# Patient Record
(demographics unavailable — no encounter records)

---

## 2024-11-25 NOTE — HISTORY OF PRESENT ILLNESS
[Never] : never [TextBox_4] : Referred by Dr. Peñaloza for Pulmonary Hypertension evaluation in the setting of dilated pulmonary artery, elevated PASP (78 mmHg), worsening dyspnea on exertion and a 6MWT with desaturation to 87% necessitating O2. 2L Her history is significant for limited scleroderma, congestive heart failure with diastolic dysfunction, ground glass opacities on chest CT, multilobar pneumonia, pleural effusion, decreased diffusion capacity of lung and worsenign BRANHAM since COVID booster 11/2021. Concern is for WHO Group II (HFpEF, HTN, LVH, JESUS), III (found to have severe BO AHI 61), at risk for WHO Group I in setting of limited scleroderma. V/Q scan (neg for CTEPH). She did not tolerate CPAP and her most recent echo 9/2022 PASP was 66 mmHg, but RV size/function now normalized, + Grade II diastolic dysfunction and severe LAE. She started Jardiance October 2022.  Had significant Raynauds w/ ulceration and ultimately started on sildenafil, isosorbide was discontinued. Diuretics adjusted for LE edema. She was unable to tolerate PAP for severe BO, is not a candidate for inspire. Found to have pulmonary nodule incidentally when sent for CT A/P, enlarging from 2mm in 2019 to 9mm in 2024 that is not PET avid but likely represents malignancy.  PH regimen sildenafil 20 mg TID  11-27-24 She had her repeat chest CT in August and Dr. Zapata would like to repeat it in February 2025.   8-7-24: Would like a pre-op clearance for L TKA scheduled for 8/28 at [facility]. Does not want to use CPAP as it affects her sleep. Not interested in trying anything for it and understands risks of MI, CVA, and death without using it. Continues to take sildenafil 20 mg TID.  No fever, chills, chest pain, cough. Mentions improved leg swelling. Lost some weight 8lbs since May 2024- eating less. Shortness of breath improved since the last visit. Can walk 2-3 blocks before feeling short of breath.

## 2024-11-25 NOTE — PHYSICAL EXAM
[No Acute Distress] : no acute distress [Normal Oropharynx] : normal oropharynx [Normal Appearance] : normal appearance [No Neck Mass] : no neck mass [Normal Rate/Rhythm] : normal rate/rhythm [Normal S1, S2] : normal s1, s2 [No Murmurs] : no murmurs [No Resp Distress] : no resp distress [Clear to Auscultation Bilaterally] : clear to auscultation bilaterally [No Abnormalities] : no abnormalities [Benign] : benign [No Clubbing] : no clubbing [No Cyanosis] : no cyanosis [FROM] : FROM [2+ Pitting] : 2+ pitting [Normal Color/ Pigmentation] : normal color/ pigmentation [No Focal Deficits] : no focal deficits [Oriented x3] : oriented x3 [Normal Affect] : normal affect [TextBox_105] : Asymmetric edema, R>L slightly  [TextBox_125] : left 4th digit s/p DIP joint resected for Raynaud's; no exudate or wheeping, tender but area surrounding is not warm

## 2024-11-25 NOTE — CONSULT LETTER
[Dear  ___] : Dear  [unfilled], [Consult Letter:] : I had the pleasure of evaluating your patient, [unfilled]. [Please see my note below.] : Please see my note below. [Consult Closing:] : Thank you very much for allowing me to participate in the care of this patient.  If you have any questions, please do not hesitate to contact me. [Sincerely,] : Sincerely, [DrMargo  ___] : Dr. MATOS [FreeTextEntry2] : Dr. Fern Peñaloza\par  [address]\[place] [address]\par  \par  Fax [phone] [FreeTextEntry3] : Glory River DO \par  Director of Pulmonary Hypertension\par  Department of Pulmonary and Critical Care\par  [facility]

## 2024-11-25 NOTE — ASSESSMENT
[FreeTextEntry1] : 78 yo F with hx of CAD, HTN, HFpEF, pAF, limited scleroderma referred to PH clinic for BRANHAM a/w elevated PASP  1. Pulmonary hypertension- Multifactorial, likely WHO Group I 2/2 scleroderma, WHO Group II PH given HFpEF, severe LAE, HTN, and WHO Group III PH 2/2 severe untreated BO. NYHA FC has improved to I-II (initially III at first visit) with diuresis. She had concern for Barton's sign on prior TTE, but V/Q negative and repeat TTE with normal RV size/function although PASP remains elevated, grade II diastolic dysfunction, and severe LAE.  Her sleep study also confirms severe BO with AHI of 61 and randee SpO2 of 75% however she has been unable to tolerate PAP. She has also subsequently been started on sildenafil for Raynaud's, is at risk for worsening PH-LHD if she is predominantly group II. RHC performed with complex results - she is predominantly precapillary at baseline when well diuresed; however, performed vasoreactivity testing for severe PH and paradoxically had worsening PAWP (20, V waves to 27mmHg) and decrease in CO/CI. Has remained on sildenafil due to severe raynaud's and no worsening of symptoms.   Will plan to continue sildenafil 20mg TID, but given drop in CO/CI w/ Shaun and occasional dizziness/low BP with sildenafil her verapamil was discontinued. TTE repeated 1/2024 with normal RV size/function, normal LV, mild TR and PASP 47mmHg from 38mmHg 11/2023 (was 66mmHg in 2022). DLCO severely reduced although stable from 1 year ago, lung volumes unchanged, walk distance stable and no longer hypoxic with exertion.  Continue diuretics and sildenafil, trend NTproBNP  2. Enlarging pulmonary nodule - Found incidentally on CT A/P, CT chest with 9mm growing RLL nodule, central and not amenable to CT guided or bronchoscopic bx. Referred to CTS - given age, comorbidities, and severely reduced DLCO she is high risk for surgery. PET scan without uptake (measured on PET to be 7mm). Unclear rate of growth - if continues to enlarge over short period of time (ie 6 months) would recommend resection, but if remains very slow growing would likely opt for monitoring. Plan to repeat CT chest in 6 months (9/2024)  3. Obstructive Sleep Apnea AHI 62.2  -unable to tolerate treatment, this increases her risk of cardiopulmonary disease but she understands. Has some night time awakenings with SOB, but no alternative treatment available for her. Not a candidate for surgical intervention. Recommend she continue nocturnal oxygen supplementation for significantly increased time spent hypoxic <90% at night.  In summary: - She will continue to follow with her cardiologist, maintain euvolemia and BP control. She is no longer on isosorbide 30 mg BID or verapamil. Currently on sildenafil 20mg TID per rheumatology.  - She no longer desaturates with exertion, but has oxygen therapy as needed, lung volumes and DLCO stable - Continue sildenafil 20mg TID for now as it has improved Raynaud's and she has significant precapillary component.  - Continue Jardiance, lasix 4 days a week, spironolactone 3 days a week - Repeat CT chest in September (6 months) to reassess nodule; already ordered by CT surgery - Planning for knee replacement Aug 28th 2024, see pre op evaluation below - Repeat PFTs and 6MWT every 6 months (October 2024) - TTE annually (Jan 2025) - BNP ordered for today  Pre-op pulmonary clearance: ARISCAT score: 11 low risk; 1.6% in hospital post-op pulmonary complications SUGGS score: 5% risk of mechanical ventilation for >48 hours after surgery, or unexplained intubation <30 days of surgery *Of note, these pre-op risk calculators do not take into account patient's BO or PH. She remains high risk for general anesthesia in setting of PH, although medically she is optimized. There is no absolute pulmonary contraindication to proceed with knee surgery. Requires cardiac clearance pending stress test. Recommend spinal/regional anesthesia for upcoming L TKA in setting of severe BO (AHI 61). If GA required, recommend cardiac anesthesia and please extubate to PAP therapy

## 2024-11-25 NOTE — REASON FOR VISIT
[Follow-Up] : a follow-up visit [Pulmonary Hypertension] : pulmonary hypertension [Shortness of Breath] : shortness of breath [[employer] ] : provided by [employer]   [TextBox_13] : Dr. Fern Peñaloza [Interpreters_IDNumber] : [id number] [Interpreters_FullName] : Naz

## 2024-11-25 NOTE — REVIEW OF SYSTEMS
[Chest Tightness] : chest tightness [SOB on Exertion] : sob on exertion [Raynaud] : raynaud [Obesity] : obesity [Negative] : Endocrine [TextBox_104] : gangrenous digit

## 2024-11-25 NOTE — END OF VISIT
[] : Fellow [Time Spent: ___ minutes] : I have spent [unfilled] minutes of time on the encounter which excludes teaching and separately reported services. [FreeTextEntry3] : WHO Group I PAH 2/2 CTD and element of HFpEF improved on sildenafil, but limited in increasing therapy due to presence of HFpEF. Hypoxia improved, 6MWT increased although still severely reduced, but may be limited 2/2 knee pain. Planning for TKR, pt will always remain higher risk for general anesthesia, but she is medically optimized. Recommend consideration of local/spinal anesthesia. If not possible, consider cardiac anesthesia for monitoring. TTE with significantly improved PASP although PASP has always been underestimated compared to her RHC. Awaiting stress test by cardiology. Medically cleared from pulmonary standpoint. Repeat PFT, 6MWT, and NTproBNP in 3 months.

## 2024-11-25 NOTE — PROCEDURE
[FreeTextEntry1] : CT Chest non-con 8/17/24: Impression: 1. No developing suspicious parenchymal nodules or masses. There is a 6 mm solid nodule within the medial basal segment of the right lower lobe unchanged from 4/29/2024 and 3/6/2024. It appears more conspicuous in size when compared to 11/11/2021 when it measured 4 mm. No visible uptake of this nodule in whole-body PET dated 4/29/2024. Continued short interval surveillance of this nodule in 6-12 months is suggested to confirm stability. 2. No other significant interval changes. ***** PFT 4/2024 FVC 2.08 (89%) --> 2.08  FEV1 1.50 (87%) --> 1.55 FEV1/FVC 72% TLC 3.24 (73%) DLCO 6.22 (34%)  6MWT 210 meters, SpO2 99% --> 93% on RA, HR 56 --> 72, /85 --> 170/84  DLCO severely reduced although stable from 1 year ago, lung volumes unchanged, walk distance stable and no longer hypoxic with exertion.  *** PET-CT performed on 4/29/24: 7 mm nodule at the basilar right lower lobe does not demonstrate visible uptake No hypermetabolic pulmonary nodules. ***** 3/6/24 CT Chest at [facility] Enlarging RLL nodule 2mm -> 9 mm Enlarged PA Stable mildly patulous thoracic esophagus with questionable h/o sceroderma Decreased pleural effusions Stable borderline mediastinal adenopathy Moderate coronary artery calcifications *****  11/8/23 ECHO 1. Mild symmetric left ventricular hypertrophy.  2. Normal left ventricular systolic function.  3. Grade II left ventricular diastolic dysfunction.  4. Normal right ventricular size and systolic function.  5. Dilated left atrium.  6. Mild-to-moderate mitral regurgitation.  7. Aortic sclerosis without significant stenosis.  8. Pulmonary artery systolic pressure is 38 mmHg.  9. Trivial pericardial effusion. 10. 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 This was done OFF sildenafil. F/U in PH clinic **** 3/29/22 PFT and 6MWT  FVC 2.06 (87) FEV1 1.59 (90) FEV1/FVC 77 FEF 25-75% 1.34 (94) TLC 3.35 (76) VC 2.06 (87) RV/TLC 38.64 (86) DLCO 5.94 (32)  225 meters Resting SpO2 94% on room air With 3 min 10 sec remaining SpO2 dropped to 88% on room air. Pt took a short break and continued walking End test SpO2 88% on room air Hemant 3 ***** 3/21/23 ECHO  1. Normal left ventricular size and systolic function.  2. Grade II left ventricular diastolic dysfunction.  3. Normal right ventricular size and systolic function.  4. Severely dilated left atrium.  5. Aortic sclerosis without significant stenosis.  6. Mild mitral regurgitation.  7. Pulmonary hypertension present, pulmonary artery systolic pressure is 41 mmHg.  8. Trivial pericardial effusion.  9. Compared to the previous TTE performed on 9/13/2022, PASP is lower in this study. ***** 10/3/22 [employer] APAP Compliance Data 6/78 days usage (7.7% compliance) avg use 1 hr 7 min ***** 9/13/22 ECHO 1. Normal left ventricular size and systolic function. 2. Grade II left ventricular diastolic dysfunction. 3. Normal right ventricular size and systolic function. 4. Severely dilated left atrium. 5. Mild mitral regurgitation. 6. Mild tricuspid regurgitation. 7. Pulmonary hypertension present, pulmonary artery systolic pressure is 66 mmHg. 8. No pericardial effusion. 9. Compared to the previous TTE performed on 2/18/2022, right ventricular function is normal and there is severe pulmonary hypertension ***** 5/23/22 LABS NT pro  ***** 2/18/22 V/Q scan No evidence of pulmonary emboli ***** 2/18/22 ECHO  1. Normal left ventricular cavity size and systolic function.  2. Grade III left ventricular diastolic dysfunction with elevated  filling pressure.  3. Mildly dilated right ventricle.  4. Moderately reduced right ventricular systolic function with apical  sparing consistent with possible pulmonary embolism (Barton's sign).  5. Mildly dilated left atrium.  6. Aortic sclerosis without significant stenosis.  7. Mild mitral regurgitation.  8. Mild tricuspid regurgitation.  9. Pulmonary hypertension present, pulmonary artery systolic pressure is  45 mmHg. 10. No pericardial effusion ***** 1/25/22 HST AHI 62.2 Gian SpO2 75% ***** 1/14/22 LABS Pro  Creatinine 1.04 *****   11/10/21  PFT FVC  2.06 (90) FEV1 1.51 (89) FEF 25-75%  1.04 (75) TLC 4.32 (98) VC  2.04 (89) RV/TLC  53 (115) DLCO 5.15 (29) Non obstructive  normal lung volumes no restrictive disease Decrease diffusion capacity  6MWT 240 meters Baseline SpO2 95% on room air SpO2 dropped to 85%, give 2L O2 End of test SpO2 94% on 2L O2 ***** 10/29/21 ECHO by Dr. Wolfe Normal LV systolic function with EF estimated 65% Basal septal hypertrophy with evidence of JESUS, no significant VLOT obstructions No evidence of LVH Anterior/posterior pericardial fat/fluid layer PASP 78 mmHg Right heart normal in size and structure ***** 11/12/19 FVC  2.34 (99) -> 2.28 (97) FEV1 1.88 (107) -> 1.46 (101) FEV1/FVC  80 -> 78 FEF 25-75% 1.75 (118) -> 1.44 (97) TLC  4.85 (109) VC  2.35 (100) RV  2.50 (121) RV/TLC 52 DLCO  6 (33)  No obstructive disease.  Normal lung volumes Decreased diffusion capacity

## 2025-01-24 NOTE — END OF VISIT
[FreeTextEntry3] : WHO Group I PAH 2/2 CTD and element of HFpEF improved on sildenafil, but limited in increasing therapy due to presence of HFpEF. Hypoxia improved, 6MWT increased although still severely reduced, but may be limited 2/2 knee pain. Planning for TKR, pt will always remain higher risk for general anesthesia, but she is medically optimized. Recommend consideration of local/spinal anesthesia. If not possible, consider cardiac anesthesia for monitoring. TTE with significantly improved PASP although PASP has always been underestimated compared to her RHC. Awaiting stress test by cardiology. Medically cleared from pulmonary standpoint. Repeat PFT, 6MWT, and NTproBNP in 3 months. 
[employer]

## 2025-01-27 NOTE — CONSULT LETTER
[FreeTextEntry2] : Dr. Fern Peñaloza\par  [address]\[place] [address]\par  \par  Fax [phone] [FreeTextEntry3] : Glory River DO \par  Director of Pulmonary Hypertension\par  Department of Pulmonary and Critical Care\par  [facility]

## 2025-01-27 NOTE — PHYSICAL EXAM
[TextBox_105] : Asymmetric 2+ pitting edema even with compression stockings, L>R (history of L TKA) [TextBox_125] : left 4th digit s/p DIP joint resected for Raynaud's; no exudate or wheeping, tender but area surrounding is not warm

## 2025-01-27 NOTE — HISTORY OF PRESENT ILLNESS
[TextBox_4] : Referred by Dr. Peñaloza for Pulmonary Hypertension evaluation in the setting of dilated pulmonary artery, elevated PASP (78 mmHg), worsening dyspnea on exertion and a 6MWT with desaturation to 87% necessitating O2. 2L Her history is significant for limited scleroderma, congestive heart failure with diastolic dysfunction, ground glass opacities on chest CT, multilobar pneumonia, pleural effusion, decreased diffusion capacity of lung and worsenign BRANHAM since COVID booster 11/2021. Concern is for WHO Group II (HFpEF, HTN, LVH, JESUS), III (found to have severe BO AHI 61), at risk for WHO Group I in setting of limited scleroderma. V/Q scan (neg for CTEPH). She did not tolerate CPAP and her most recent echo 9/2022 PASP was 66 mmHg, but RV size/function now normalized, + Grade II diastolic dysfunction and severe LAE. She started Jardiance October 2022.  Had significant Raynauds w/ ulceration and ultimately started on sildenafil, isosorbide was discontinued. Diuretics adjusted for LE edema. She was unable to tolerate PAP for severe BO, is not a candidate for inspire. Found to have pulmonary nodule incidentally when sent for CT A/P, enlarging from 2mm in 2019 to 9mm in 2024 that is not PET avid but likely represents malignancy.  PH regimen sildenafil 20 mg TID  1-24-25 She had her repeat chest CT in August and Dr. Zapata would like to repeat it in February 2025.  Mentions shortness of breath with exertion and palpitations, relatively stable from prior. Ambulation has improved since knee surgery as knee pain is much better, but has now chronic LE edema of LLE since surgery. Has BO but cannot tolerate CPAP. Denies fever, chills, night sweats, or weight loss.

## 2025-01-27 NOTE — CONSULT LETTER
[FreeTextEntry2] : Dr. Fern Peñaloza\par  [address]\[place] [address]\par  \par  Fax [phone] [FreeTextEntry3] : Glory River DO \par  Director of Pulmonary Hypertension\par  Department of Pulmonary and Critical Care\par  [facility]

## 2025-01-27 NOTE — ASSESSMENT
[FreeTextEntry1] : 78 yo F with hx of CAD, HTN, HFpEF, pAF, limited scleroderma referred to PH clinic for BRANHAM a/w elevated PASP  1. Pulmonary hypertension- Multifactorial, likely WHO Group I 2/2 scleroderma, WHO Group II PH given HFpEF, severe LAE, HTN, and WHO Group III PH 2/2 severe untreated BO. NYHA FC has improved to I-II (initially III at first visit) with diuresis. She had concern for Barton's sign on prior TTE, but V/Q negative and repeat TTE with normal RV size/function although PASP remains elevated, grade II diastolic dysfunction, and severe LAE.  Her sleep study also confirms severe BO with AHI of 61 and randee SpO2 of 75% however she has been unable to tolerate PAP. She has also subsequently been started on sildenafil for Raynaud's, is at risk for worsening PH-LHD if she is predominantly group II. RHC performed with complex results - she is predominantly precapillary at baseline when well diuresed; however, performed vasoreactivity testing for severe PH and paradoxically had worsening PAWP (20, V waves to 27mmHg) and decrease in CO/CI. Has remained on sildenafil due to severe raynaud's and no worsening of symptoms.   Will plan to continue sildenafil 20mg TID, but given drop in CO/CI w/ Shaun and occasional dizziness/low BP with sildenafil her verapamil was discontinued. TTE repeated 1/2024 with normal RV size/function, normal LV, mild TR and PASP 47mmHg from 38mmHg 11/2023 (was 66mmHg in 2022). DLCO severely reduced although stable from 1 year ago, lung volumes unchanged, walk distance stable and no longer hypoxic with exertion.  -Continue diuretics and sildenafil. -Obtain NTproBNP, PFTs, and anticipate results of TTE and CT chest will result in time. -No change in diuretic dosage as her symptoms are not significant per the patient and LE edema appears unchanged.  2. Enlarging pulmonary nodule - Found incidentally on CT A/P, CT chest with 9mm growing RLL nodule, central and not amenable to CT guided or bronchoscopic bx. Referred to CTS - given age, comorbidities, and severely reduced DLCO she is high risk for surgery. PET scan without uptake (measured on PET to be 7mm). Unclear rate of growth - if continues to enlarge over short period of time (ie 6 months) would recommend resection, but if remains very slow growing would likely opt for monitoring. Plan to repeat CT chest in 6 months (2/2025)  3. Obstructive Sleep Apnea AHI 62.2  -unable to tolerate treatment, this increases her risk of cardiopulmonary disease but she understands. Has some night time awakenings with SOB, but no alternative treatment available for her. Not a candidate for surgical intervention. Recommend she continue nocturnal oxygen supplementation for significantly increased time spent hypoxic <90% at night.  RTC w/ PFT and 6MWT in 3 months

## 2025-01-27 NOTE — PROCEDURE
Pt only has one front tooth, that tooth is very decayed, front part of tooth popped off with biteblock.   Pt states just throw it away bc it is already decayed and about to fall off anyways [FreeTextEntry1] : CT Chest non-con 8/17/24: Impression: 1. No developing suspicious parenchymal nodules or masses. There is a 6 mm solid nodule within the medial basal segment of the right lower lobe unchanged from 4/29/2024 and 3/6/2024. It appears more conspicuous in size when compared to 11/11/2021 when it measured 4 mm. No visible uptake of this nodule in whole-body PET dated 4/29/2024. Continued short interval surveillance of this nodule in 6-12 months is suggested to confirm stability. 2. No other significant interval changes. ***** PFT 4/2024 FVC 2.08 (89%) --> 2.08  FEV1 1.50 (87%) --> 1.55 FEV1/FVC 72% TLC 3.24 (73%) DLCO 6.22 (34%)  6MWT 210 meters, SpO2 99% --> 93% on RA, HR 56 --> 72, /85 --> 170/84  DLCO severely reduced although stable from 1 year ago, lung volumes unchanged, walk distance stable and no longer hypoxic with exertion.  *** PET-CT performed on 4/29/24: 7 mm nodule at the basilar right lower lobe does not demonstrate visible uptake No hypermetabolic pulmonary nodules. ***** 3/6/24 CT Chest at [facility] Enlarging RLL nodule 2mm -> 9 mm Enlarged PA Stable mildly patulous thoracic esophagus with questionable h/o sceroderma Decreased pleural effusions Stable borderline mediastinal adenopathy Moderate coronary artery calcifications *****  11/8/23 ECHO 1. Mild symmetric left ventricular hypertrophy.  2. Normal left ventricular systolic function.  3. Grade II left ventricular diastolic dysfunction.  4. Normal right ventricular size and systolic function.  5. Dilated left atrium.  6. Mild-to-moderate mitral regurgitation.  7. Aortic sclerosis without significant stenosis.  8. Pulmonary artery systolic pressure is 38 mmHg.  9. Trivial pericardial effusion. 10. 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 This was done OFF sildenafil. F/U in PH clinic **** 3/29/22 PFT and 6MWT  FVC 2.06 (87) FEV1 1.59 (90) FEV1/FVC 77 FEF 25-75% 1.34 (94) TLC 3.35 (76) VC 2.06 (87) RV/TLC 38.64 (86) DLCO 5.94 (32)  225 meters Resting SpO2 94% on room air With 3 min 10 sec remaining SpO2 dropped to 88% on room air. Pt took a short break and continued walking End test SpO2 88% on room air Hemant 3 ***** 3/21/23 ECHO  1. Normal left ventricular size and systolic function.  2. Grade II left ventricular diastolic dysfunction.  3. Normal right ventricular size and systolic function.  4. Severely dilated left atrium.  5. Aortic sclerosis without significant stenosis.  6. Mild mitral regurgitation.  7. Pulmonary hypertension present, pulmonary artery systolic pressure is 41 mmHg.  8. Trivial pericardial effusion.  9. Compared to the previous TTE performed on 9/13/2022, PASP is lower in this study. ***** 10/3/22 [employer] APAP Compliance Data 6/78 days usage (7.7% compliance) avg use 1 hr 7 min ***** 9/13/22 ECHO 1. Normal left ventricular size and systolic function. 2. Grade II left ventricular diastolic dysfunction. 3. Normal right ventricular size and systolic function. 4. Severely dilated left atrium. 5. Mild mitral regurgitation. 6. Mild tricuspid regurgitation. 7. Pulmonary hypertension present, pulmonary artery systolic pressure is 66 mmHg. 8. No pericardial effusion. 9. Compared to the previous TTE performed on 2/18/2022, right ventricular function is normal and there is severe pulmonary hypertension ***** 5/23/22 LABS NT pro  ***** 2/18/22 V/Q scan No evidence of pulmonary emboli ***** 2/18/22 ECHO  1. Normal left ventricular cavity size and systolic function.  2. Grade III left ventricular diastolic dysfunction with elevated  filling pressure.  3. Mildly dilated right ventricle.  4. Moderately reduced right ventricular systolic function with apical  sparing consistent with possible pulmonary embolism (Barton's sign).  5. Mildly dilated left atrium.  6. Aortic sclerosis without significant stenosis.  7. Mild mitral regurgitation.  8. Mild tricuspid regurgitation.  9. Pulmonary hypertension present, pulmonary artery systolic pressure is  45 mmHg. 10. No pericardial effusion ***** 1/25/22 HST AHI 62.2 Gian SpO2 75% ***** 1/14/22 LABS Pro  Creatinine 1.04 *****   11/10/21  PFT FVC  2.06 (90) FEV1 1.51 (89) FEF 25-75%  1.04 (75) TLC 4.32 (98) VC  2.04 (89) RV/TLC  53 (115) DLCO 5.15 (29) Non obstructive  normal lung volumes no restrictive disease Decrease diffusion capacity  6MWT 240 meters Baseline SpO2 95% on room air SpO2 dropped to 85%, give 2L O2 End of test SpO2 94% on 2L O2 ***** 10/29/21 ECHO by Dr. Wolfe Normal LV systolic function with EF estimated 65% Basal septal hypertrophy with evidence of JESUS, no significant VLOT obstructions No evidence of LVH Anterior/posterior pericardial fat/fluid layer PASP 78 mmHg Right heart normal in size and structure ***** 11/12/19 FVC  2.34 (99) -> 2.28 (97) FEV1 1.88 (107) -> 1.46 (101) FEV1/FVC  80 -> 78 FEF 25-75% 1.75 (118) -> 1.44 (97) TLC  4.85 (109) VC  2.35 (100) RV  2.50 (121) RV/TLC 52 DLCO  6 (33)  No obstructive disease.  Normal lung volumes Decreased diffusion capacity

## 2025-01-27 NOTE — PROCEDURE
[FreeTextEntry1] : CT Chest non-con 8/17/24: Impression: 1. No developing suspicious parenchymal nodules or masses. There is a 6 mm solid nodule within the medial basal segment of the right lower lobe unchanged from 4/29/2024 and 3/6/2024. It appears more conspicuous in size when compared to 11/11/2021 when it measured 4 mm. No visible uptake of this nodule in whole-body PET dated 4/29/2024. Continued short interval surveillance of this nodule in 6-12 months is suggested to confirm stability. 2. No other significant interval changes. ***** PFT 4/2024 FVC 2.08 (89%) --> 2.08  FEV1 1.50 (87%) --> 1.55 FEV1/FVC 72% TLC 3.24 (73%) DLCO 6.22 (34%)  6MWT 210 meters, SpO2 99% --> 93% on RA, HR 56 --> 72, /85 --> 170/84  DLCO severely reduced although stable from 1 year ago, lung volumes unchanged, walk distance stable and no longer hypoxic with exertion.  *** PET-CT performed on 4/29/24: 7 mm nodule at the basilar right lower lobe does not demonstrate visible uptake No hypermetabolic pulmonary nodules. ***** 3/6/24 CT Chest at [facility] Enlarging RLL nodule 2mm -> 9 mm Enlarged PA Stable mildly patulous thoracic esophagus with questionable h/o sceroderma Decreased pleural effusions Stable borderline mediastinal adenopathy Moderate coronary artery calcifications *****  11/8/23 ECHO 1. Mild symmetric left ventricular hypertrophy.  2. Normal left ventricular systolic function.  3. Grade II left ventricular diastolic dysfunction.  4. Normal right ventricular size and systolic function.  5. Dilated left atrium.  6. Mild-to-moderate mitral regurgitation.  7. Aortic sclerosis without significant stenosis.  8. Pulmonary artery systolic pressure is 38 mmHg.  9. Trivial pericardial effusion. 10. 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 This was done OFF sildenafil. F/U in PH clinic **** 3/29/22 PFT and 6MWT  FVC 2.06 (87) FEV1 1.59 (90) FEV1/FVC 77 FEF 25-75% 1.34 (94) TLC 3.35 (76) VC 2.06 (87) RV/TLC 38.64 (86) DLCO 5.94 (32)  225 meters Resting SpO2 94% on room air With 3 min 10 sec remaining SpO2 dropped to 88% on room air. Pt took a short break and continued walking End test SpO2 88% on room air Hemant 3 ***** 3/21/23 ECHO  1. Normal left ventricular size and systolic function.  2. Grade II left ventricular diastolic dysfunction.  3. Normal right ventricular size and systolic function.  4. Severely dilated left atrium.  5. Aortic sclerosis without significant stenosis.  6. Mild mitral regurgitation.  7. Pulmonary hypertension present, pulmonary artery systolic pressure is 41 mmHg.  8. Trivial pericardial effusion.  9. Compared to the previous TTE performed on 9/13/2022, PASP is lower in this study. ***** 10/3/22 [employer] Connect APAP Compliance Data 6/78 days usage (7.7% compliance) avg use 1 hr 7 min ***** 9/13/22 ECHO 1. Normal left ventricular size and systolic function. 2. Grade II left ventricular diastolic dysfunction. 3. Normal right ventricular size and systolic function. 4. Severely dilated left atrium. 5. Mild mitral regurgitation. 6. Mild tricuspid regurgitation. 7. Pulmonary hypertension present, pulmonary artery systolic pressure is 66 mmHg. 8. No pericardial effusion. 9. Compared to the previous TTE performed on 2/18/2022, right ventricular function is normal and there is severe pulmonary hypertension ***** 5/23/22 LABS NT pro  ***** 2/18/22 V/Q scan No evidence of pulmonary emboli ***** 2/18/22 ECHO  1. Normal left ventricular cavity size and systolic function.  2. Grade III left ventricular diastolic dysfunction with elevated  filling pressure.  3. Mildly dilated right ventricle.  4. Moderately reduced right ventricular systolic function with apical  sparing consistent with possible pulmonary embolism (Barton's sign).  5. Mildly dilated left atrium.  6. Aortic sclerosis without significant stenosis.  7. Mild mitral regurgitation.  8. Mild tricuspid regurgitation.  9. Pulmonary hypertension present, pulmonary artery systolic pressure is  45 mmHg. 10. No pericardial effusion ***** 1/25/22 HST AHI 62.2 Gian SpO2 75% ***** 1/14/22 LABS Pro  Creatinine 1.04 *****   11/10/21  PFT FVC  2.06 (90) FEV1 1.51 (89) FEF 25-75%  1.04 (75) TLC 4.32 (98) VC  2.04 (89) RV/TLC  53 (115) DLCO 5.15 (29) Non obstructive  normal lung volumes no restrictive disease Decrease diffusion capacity  6MWT 240 meters Baseline SpO2 95% on room air SpO2 dropped to 85%, give 2L O2 End of test SpO2 94% on 2L O2 ***** 10/29/21 ECHO by Dr. Wolfe Normal LV systolic function with EF estimated 65% Basal septal hypertrophy with evidence of JESUS, no significant VLOT obstructions No evidence of LVH Anterior/posterior pericardial fat/fluid layer PASP 78 mmHg Right heart normal in size and structure ***** 11/12/19 FVC  2.34 (99) -> 2.28 (97) FEV1 1.88 (107) -> 1.46 (101) FEV1/FVC  80 -> 78 FEF 25-75% 1.75 (118) -> 1.44 (97) TLC  4.85 (109) VC  2.35 (100) RV  2.50 (121) RV/TLC 52 DLCO  6 (33)  No obstructive disease.  Normal lung volumes Decreased diffusion capacity

## 2025-01-27 NOTE — ASSESSMENT
[FreeTextEntry1] : 76 yo F with hx of CAD, HTN, HFpEF, pAF, limited scleroderma referred to PH clinic for BRANHAM a/w elevated PASP  1. Pulmonary hypertension- Multifactorial, likely WHO Group I 2/2 scleroderma, WHO Group II PH given HFpEF, severe LAE, HTN, and WHO Group III PH 2/2 severe untreated BO. NYHA FC has improved to I-II (initially III at first visit) with diuresis. She had concern for Barton's sign on prior TTE, but V/Q negative and repeat TTE with normal RV size/function although PASP remains elevated, grade II diastolic dysfunction, and severe LAE.  Her sleep study also confirms severe BO with AHI of 61 and randee SpO2 of 75% however she has been unable to tolerate PAP. She has also subsequently been started on sildenafil for Raynaud's, is at risk for worsening PH-LHD if she is predominantly group II. RHC performed with complex results - she is predominantly precapillary at baseline when well diuresed; however, performed vasoreactivity testing for severe PH and paradoxically had worsening PAWP (20, V waves to 27mmHg) and decrease in CO/CI. Has remained on sildenafil due to severe raynaud's and no worsening of symptoms.   Will plan to continue sildenafil 20mg TID, but given drop in CO/CI w/ Shaun and occasional dizziness/low BP with sildenafil her verapamil was discontinued. TTE repeated 1/2024 with normal RV size/function, normal LV, mild TR and PASP 47mmHg from 38mmHg 11/2023 (was 66mmHg in 2022). DLCO severely reduced although stable from 1 year ago, lung volumes unchanged, walk distance stable and no longer hypoxic with exertion.  -Continue diuretics and sildenafil. -Obtain NTproBNP, PFTs, and anticipate results of TTE and CT chest will result in time. -No change in diuretic dosage as her symptoms are not significant per the patient and LE edema appears unchanged.  2. Enlarging pulmonary nodule - Found incidentally on CT A/P, CT chest with 9mm growing RLL nodule, central and not amenable to CT guided or bronchoscopic bx. Referred to CTS - given age, comorbidities, and severely reduced DLCO she is high risk for surgery. PET scan without uptake (measured on PET to be 7mm). Unclear rate of growth - if continues to enlarge over short period of time (ie 6 months) would recommend resection, but if remains very slow growing would likely opt for monitoring. Plan to repeat CT chest in 6 months (2/2025)  3. Obstructive Sleep Apnea AHI 62.2  -unable to tolerate treatment, this increases her risk of cardiopulmonary disease but she understands. Has some night time awakenings with SOB, but no alternative treatment available for her. Not a candidate for surgical intervention. Recommend she continue nocturnal oxygen supplementation for significantly increased time spent hypoxic <90% at night.  RTC w/ PFT and 6MWT in 3 months

## 2025-01-27 NOTE — CONSULT LETTER
[FreeTextEntry2] : Dr. Fern Peñaloza\par  [address]\[place] [address]\par  \par  Fax [phone] [FreeTextEntry3] : Glory River DO \par  Director of Pulmonary Hypertension\par  Department of Pulmonary and Critical Care\par  [facility]

## 2025-01-27 NOTE — PROCEDURE
[FreeTextEntry1] : CT Chest non-con 8/17/24: Impression: 1. No developing suspicious parenchymal nodules or masses. There is a 6 mm solid nodule within the medial basal segment of the right lower lobe unchanged from 4/29/2024 and 3/6/2024. It appears more conspicuous in size when compared to 11/11/2021 when it measured 4 mm. No visible uptake of this nodule in whole-body PET dated 4/29/2024. Continued short interval surveillance of this nodule in 6-12 months is suggested to confirm stability. 2. No other significant interval changes. ***** PFT 4/2024 FVC 2.08 (89%) --> 2.08  FEV1 1.50 (87%) --> 1.55 FEV1/FVC 72% TLC 3.24 (73%) DLCO 6.22 (34%)  6MWT 210 meters, SpO2 99% --> 93% on RA, HR 56 --> 72, /85 --> 170/84  DLCO severely reduced although stable from 1 year ago, lung volumes unchanged, walk distance stable and no longer hypoxic with exertion.  *** PET-CT performed on 4/29/24: 7 mm nodule at the basilar right lower lobe does not demonstrate visible uptake No hypermetabolic pulmonary nodules. ***** 3/6/24 CT Chest at [facility] Enlarging RLL nodule 2mm -> 9 mm Enlarged PA Stable mildly patulous thoracic esophagus with questionable h/o sceroderma Decreased pleural effusions Stable borderline mediastinal adenopathy Moderate coronary artery calcifications *****  11/8/23 ECHO 1. Mild symmetric left ventricular hypertrophy.  2. Normal left ventricular systolic function.  3. Grade II left ventricular diastolic dysfunction.  4. Normal right ventricular size and systolic function.  5. Dilated left atrium.  6. Mild-to-moderate mitral regurgitation.  7. Aortic sclerosis without significant stenosis.  8. Pulmonary artery systolic pressure is 38 mmHg.  9. Trivial pericardial effusion. 10. 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 This was done OFF sildenafil. F/U in PH clinic **** 3/29/22 PFT and 6MWT  FVC 2.06 (87) FEV1 1.59 (90) FEV1/FVC 77 FEF 25-75% 1.34 (94) TLC 3.35 (76) VC 2.06 (87) RV/TLC 38.64 (86) DLCO 5.94 (32)  225 meters Resting SpO2 94% on room air With 3 min 10 sec remaining SpO2 dropped to 88% on room air. Pt took a short break and continued walking End test SpO2 88% on room air Hemant 3 ***** 3/21/23 ECHO  1. Normal left ventricular size and systolic function.  2. Grade II left ventricular diastolic dysfunction.  3. Normal right ventricular size and systolic function.  4. Severely dilated left atrium.  5. Aortic sclerosis without significant stenosis.  6. Mild mitral regurgitation.  7. Pulmonary hypertension present, pulmonary artery systolic pressure is 41 mmHg.  8. Trivial pericardial effusion.  9. Compared to the previous TTE performed on 9/13/2022, PASP is lower in this study. ***** 10/3/22 i [employer] Connect APAP Compliance Data 6/78 days usage (7.7% compliance) avg use 1 hr 7 min ***** 9/13/22 ECHO 1. Normal left ventricular size and systolic function. 2. Grade II left ventricular diastolic dysfunction. 3. Normal right ventricular size and systolic function. 4. Severely dilated left atrium. 5. Mild mitral regurgitation. 6. Mild tricuspid regurgitation. 7. Pulmonary hypertension present, pulmonary artery systolic pressure is 66 mmHg. 8. No pericardial effusion. 9. Compared to the previous TTE performed on 2/18/2022, right ventricular function is normal and there is severe pulmonary hypertension ***** 5/23/22 LABS NT pro  ***** 2/18/22 V/Q scan No evidence of pulmonary emboli ***** 2/18/22 ECHO  1. Normal left ventricular cavity size and systolic function.  2. Grade III left ventricular diastolic dysfunction with elevated  filling pressure.  3. Mildly dilated right ventricle.  4. Moderately reduced right ventricular systolic function with apical  sparing consistent with possible pulmonary embolism (Barton's sign).  5. Mildly dilated left atrium.  6. Aortic sclerosis without significant stenosis.  7. Mild mitral regurgitation.  8. Mild tricuspid regurgitation.  9. Pulmonary hypertension present, pulmonary artery systolic pressure is  45 mmHg. 10. No pericardial effusion ***** 1/25/22 HST AHI 62.2 Gian SpO2 75% ***** 1/14/22 LABS Pro  Creatinine 1.04 *****   11/10/21  PFT FVC  2.06 (90) FEV1 1.51 (89) FEF 25-75%  1.04 (75) TLC 4.32 (98) VC  2.04 (89) RV/TLC  53 (115) DLCO 5.15 (29) Non obstructive  normal lung volumes no restrictive disease Decrease diffusion capacity  6MWT 240 meters Baseline SpO2 95% on room air SpO2 dropped to 85%, give 2L O2 End of test SpO2 94% on 2L O2 ***** 10/29/21 ECHO by Dr. Wolfe Normal LV systolic function with EF estimated 65% Basal septal hypertrophy with evidence of JESUS, no significant VLOT obstructions No evidence of LVH Anterior/posterior pericardial fat/fluid layer PASP 78 mmHg Right heart normal in size and structure ***** 11/12/19 FVC  2.34 (99) -> 2.28 (97) FEV1 1.88 (107) -> 1.46 (101) FEV1/FVC  80 -> 78 FEF 25-75% 1.75 (118) -> 1.44 (97) TLC  4.85 (109) VC  2.35 (100) RV  2.50 (121) RV/TLC 52 DLCO  6 (33)  No obstructive disease.  Normal lung volumes Decreased diffusion capacity

## 2025-02-27 NOTE — HISTORY OF PRESENT ILLNESS
[FreeTextEntry1] : Patient is a 80 y/o female, Sudanese speaking, never a smoker, w/ a PMHx of pulmonary HTN, severe BO (unable to tolerate CPAP/BIPAP), HTN, paroxysmal AFIB s/p ablation, anemia, Raynaud's, and pleural effusion. She was referred by her pulmonologist Dr. Glory River for an enlarging right lower lobe pulmonary nodule.  CT chest performed on 03/06/2024 showed an enlarging right lower lobe lung nodule 9mm, which showed no FDG uptake on following PET/CT 04/25/2024. There was also evidence of pulm HTN and poor PFT (FEV1 of 87% of predicted value and DLCO of 34% of predicted value). She opted to delay lung resection due to her high risk. CT Chest from 08/17/24 showed stability of the RLL nodule since 4/29/2024 and 3/6/2024. The plan was to obtain another CT in 6 months. She presents today for a follow-up visit to review.   CT Chest 02/24/25:  IMPRESSION:  1. New mild probable pulmonary venous congestion with minimal interlobular septal thickening and new bibasilar trace pleural effusions. 2. Solid 7 mm nodule within the medial basal segment of the right lower lobe which may represent a benign hamartoma. It appears essentially unchanged in size when compared to 3/6/2024 however larger in size when compared to 11/1/2021. Continued short interval surveillance in 6-12 months and/or a follow-up whole-body PET/CT to confirm benignity. 3. Increased number of predominantly nonenlarged mediastinal lymph nodes which are thought to be reactive in etiology.

## 2025-02-27 NOTE — ASSESSMENT
[FreeTextEntry1] : 78 y/o female, Estonian speaking, never a smoker, w/ a PMHx of pulmonary HTN, severe BO (unable to tolerate CPAP/BIPAP), HTN, paroxysmal AFIB s/p ablation, anemia, Raynaud's, and pleural effusion. She was referred by her pulmonologist Dr. Glory River for an enlarging right lower lobe pulmonary nodule.  CT chest performed on 03/06/2024 showed an enlarging right lower lobe lung nodule 9mm, which showed no FDG uptake on following PET/CT 04/25/2024. There was also evidence of pulm HTN and poor PFT (FEV1 of 87% of predicted value and DLCO of 34% of predicted value). She opted to delay lung resection due to her high risk. CT Chest from 08/17/24 showed stability of the RLL nodule since 4/29/2024 and 3/6/2024. The plan was to obtain another CT in 6 months. She presents today for a follow-up visit to review.  CT Chest 02/24/25: IMPRESSION:  1. New mild probable pulmonary venous congestion with minimal interlobular septal thickening and new bibasilar trace pleural effusions. 2. Solid 7 mm nodule within the medial basal segment of the right lower lobe which may represent a benign hamartoma. It appears essentially unchanged in size when compared to 3/6/2024 however larger in size when compared to 11/1/2021. Continued short interval surveillance in 6-12 months and/or a follow-up whole-body PET/CT to confirm benignity. 3. Increased number of predominantly nonenlarged mediastinal lymph nodes which are thought to be reactive in etiology.  She is doing well and has no complaints. Her chest CT including images were reviewed and shows a stable 7 mm right lower lobe nodule, Given this, we will continue to follow with a chest CT in 6 months.  Plan: Chest CT 6 months  I, Dr. Liseth Zapata MD, personally performed the evaluation and management (E/M) services for this established patient who presents today with (a) new problem(s)/exacerbation of (an) existing condition(s).  That E/M includes conducting the clinically appropriate interval history &/or exam, assessing all new/exacerbated conditions, and establishing a new plan of care. I have also independently reviewed the medical records and imaging at the time of this office visit, and discussed the above mentioned images with interpretations with the patient. Today, my ARCELIA was here to observe &/or participate in the visit & follow plan of care established by me.

## 2025-02-27 NOTE — ASSESSMENT
[FreeTextEntry1] : 80 y/o female, Guatemalan speaking, never a smoker, w/ a PMHx of pulmonary HTN, severe BO (unable to tolerate CPAP/BIPAP), HTN, paroxysmal AFIB s/p ablation, anemia, Raynaud's, and pleural effusion. She was referred by her pulmonologist Dr. Glory River for an enlarging right lower lobe pulmonary nodule.  CT chest performed on 03/06/2024 showed an enlarging right lower lobe lung nodule 9mm, which showed no FDG uptake on following PET/CT 04/25/2024. There was also evidence of pulm HTN and poor PFT (FEV1 of 87% of predicted value and DLCO of 34% of predicted value). She opted to delay lung resection due to her high risk. CT Chest from 08/17/24 showed stability of the RLL nodule since 4/29/2024 and 3/6/2024. The plan was to obtain another CT in 6 months. She presents today for a follow-up visit to review.  CT Chest 02/24/25: IMPRESSION:  1. New mild probable pulmonary venous congestion with minimal interlobular septal thickening and new bibasilar trace pleural effusions. 2. Solid 7 mm nodule within the medial basal segment of the right lower lobe which may represent a benign hamartoma. It appears essentially unchanged in size when compared to 3/6/2024 however larger in size when compared to 11/1/2021. Continued short interval surveillance in 6-12 months and/or a follow-up whole-body PET/CT to confirm benignity. 3. Increased number of predominantly nonenlarged mediastinal lymph nodes which are thought to be reactive in etiology.  She is doing well and has no complaints. Her chest CT including images were reviewed and shows a stable 7 mm right lower lobe nodule, Given this, we will continue to follow with a chest CT in 6 months.  Plan: Chest CT 6 months  I, Dr. Liseth Zapata MD, personally performed the evaluation and management (E/M) services for this established patient who presents today with (a) new problem(s)/exacerbation of (an) existing condition(s).  That E/M includes conducting the clinically appropriate interval history &/or exam, assessing all new/exacerbated conditions, and establishing a new plan of care. I have also independently reviewed the medical records and imaging at the time of this office visit, and discussed the above mentioned images with interpretations with the patient. Today, my ARCELIA was here to observe &/or participate in the visit & follow plan of care established by me.

## 2025-02-27 NOTE — HISTORY OF PRESENT ILLNESS
[FreeTextEntry1] : Patient is a 80 y/o female, Greenlandic speaking, never a smoker, w/ a PMHx of pulmonary HTN, severe BO (unable to tolerate CPAP/BIPAP), HTN, paroxysmal AFIB s/p ablation, anemia, Raynaud's, and pleural effusion. She was referred by her pulmonologist Dr. Glory River for an enlarging right lower lobe pulmonary nodule.  CT chest performed on 03/06/2024 showed an enlarging right lower lobe lung nodule 9mm, which showed no FDG uptake on following PET/CT 04/25/2024. There was also evidence of pulm HTN and poor PFT (FEV1 of 87% of predicted value and DLCO of 34% of predicted value). She opted to delay lung resection due to her high risk. CT Chest from 08/17/24 showed stability of the RLL nodule since 4/29/2024 and 3/6/2024. The plan was to obtain another CT in 6 months. She presents today for a follow-up visit to review.   CT Chest 02/24/25:  IMPRESSION:  1. New mild probable pulmonary venous congestion with minimal interlobular septal thickening and new bibasilar trace pleural effusions. 2. Solid 7 mm nodule within the medial basal segment of the right lower lobe which may represent a benign hamartoma. It appears essentially unchanged in size when compared to 3/6/2024 however larger in size when compared to 11/1/2021. Continued short interval surveillance in 6-12 months and/or a follow-up whole-body PET/CT to confirm benignity. 3. Increased number of predominantly nonenlarged mediastinal lymph nodes which are thought to be reactive in etiology.

## 2025-02-27 NOTE — PHYSICAL EXAM
[Sclera] : the sclera and conjunctiva were normal [PERRL With Normal Accommodation] : pupils were equal in size, round, and reactive to light [Neck Appearance] : the appearance of the neck was normal [Respiration, Rhythm And Depth] : normal respiratory rhythm and effort [Heart Rate And Rhythm] : heart rate was normal and rhythm regular [Abnormal Walk] : normal gait [Nail Clubbing] : no clubbing  or cyanosis of the fingernails [FreeTextEntry1] : ambulates with cane [Skin Color & Pigmentation] : normal skin color and pigmentation [] : no rash [Sensation] : the sensory exam was normal to light touch and pinprick [Motor Exam] : the motor exam was normal [Oriented To Time, Place, And Person] : oriented to person, place, and time [Affect] : the affect was normal [Mood] : the mood was normal

## 2025-03-26 NOTE — PROCEDURE
[FreeTextEntry1] : PFT 3/26/25 - FVC 1.84 (80%), FEV1 1.28 (75%), FEV1/FVC 70%, TLC 3.05 (69%), DLCO 6.96 (38%) 6MWT 128m, SpO2 97% --> 90%, /81 --> 191/100  CT Chest 02/24/25: 1. New mild probable pulmonary venous congestion with minimal interlobular septal thickening and new bibasilar trace pleural effusions. 2. Solid 7 mm nodule within the medial basal segment of the right lower lobe which may represent a benign hamartoma. It appears essentially unchanged in size when compared to 3/6/2024 however larger in size when compared to 11/1/2021. Continued short interval surveillance in 6-12 months and/or a follow-up whole-body PET/CT to confirm benignity. 3. Increased number of predominantly nonenlarged mediastinal lymph nodes which are thought to be reactive in etiology. ***** CT Chest non-con 8/17/24: Impression: 1. No developing suspicious parenchymal nodules or masses. There is a 6 mm solid nodule within the medial basal segment of the right lower lobe unchanged from 4/29/2024 and 3/6/2024. It appears more conspicuous in size when compared to 11/11/2021 when it measured 4 mm. No visible uptake of this nodule in whole-body PET dated 4/29/2024. Continued short interval surveillance of this nodule in 6-12 months is suggested to confirm stability. 2. No other significant interval changes. ***** PFT 4/2024 FVC 2.08 (89%) --> 2.08  FEV1 1.50 (87%) --> 1.55 FEV1/FVC 72% TLC 3.24 (73%) DLCO 6.22 (34%)  6MWT 210 meters, SpO2 99% --> 93% on RA, HR 56 --> 72, /85 --> 170/84  DLCO severely reduced although stable from 1 year ago, lung volumes unchanged, walk distance stable and no longer hypoxic with exertion.  *** PET-CT performed on 4/29/24: 7 mm nodule at the basilar right lower lobe does not demonstrate visible uptake No hypermetabolic pulmonary nodules. ***** 3/6/24 CT Chest at [facility] Enlarging RLL nodule 2mm -> 9 mm Enlarged PA Stable mildly patulous thoracic esophagus with questionable h/o sceroderma Decreased pleural effusions Stable borderline mediastinal adenopathy Moderate coronary artery calcifications *****  11/8/23 ECHO 1. Mild symmetric left ventricular hypertrophy.  2. Normal left ventricular systolic function.  3. Grade II left ventricular diastolic dysfunction.  4. Normal right ventricular size and systolic function.  5. Dilated left atrium.  6. Mild-to-moderate mitral regurgitation.  7. Aortic sclerosis without significant stenosis.  8. Pulmonary artery systolic pressure is 38 mmHg.  9. Trivial pericardial effusion. 10. 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 This was done OFF sildenafil. F/U in PH clinic **** 3/29/22 PFT and 6MWT  FVC 2.06 (87) FEV1 1.59 (90) FEV1/FVC 77 FEF 25-75% 1.34 (94) TLC 3.35 (76) VC 2.06 (87) RV/TLC 38.64 (86) DLCO 5.94 (32)  225 meters Resting SpO2 94% on room air With 3 min 10 sec remaining SpO2 dropped to 88% on room air. Pt took a short break and continued walking End test SpO2 88% on room air Hemant 3 ***** 3/21/23 ECHO  1. Normal left ventricular size and systolic function.  2. Grade II left ventricular diastolic dysfunction.  3. Normal right ventricular size and systolic function.  4. Severely dilated left atrium.  5. Aortic sclerosis without significant stenosis.  6. Mild mitral regurgitation.  7. Pulmonary hypertension present, pulmonary artery systolic pressure is 41 mmHg.  8. Trivial pericardial effusion.  9. Compared to the previous TTE performed on 9/13/2022, PASP is lower in this study. ***** 10/3/22 [employer] APAP Compliance Data 6/78 days usage (7.7% compliance) avg use 1 hr 7 min ***** 9/13/22 ECHO 1. Normal left ventricular size and systolic function. 2. Grade II left ventricular diastolic dysfunction. 3. Normal right ventricular size and systolic function. 4. Severely dilated left atrium. 5. Mild mitral regurgitation. 6. Mild tricuspid regurgitation. 7. Pulmonary hypertension present, pulmonary artery systolic pressure is 66 mmHg. 8. No pericardial effusion. 9. Compared to the previous TTE performed on 2/18/2022, right ventricular function is normal and there is severe pulmonary hypertension ***** 5/23/22 LABS NT pro  ***** 2/18/22 V/Q scan No evidence of pulmonary emboli ***** 2/18/22 ECHO  1. Normal left ventricular cavity size and systolic function.  2. Grade III left ventricular diastolic dysfunction with elevated  filling pressure.  3. Mildly dilated right ventricle.  4. Moderately reduced right ventricular systolic function with apical  sparing consistent with possible pulmonary embolism (Barton's sign).  5. Mildly dilated left atrium.  6. Aortic sclerosis without significant stenosis.  7. Mild mitral regurgitation.  8. Mild tricuspid regurgitation.  9. Pulmonary hypertension present, pulmonary artery systolic pressure is  45 mmHg. 10. No pericardial effusion ***** 1/25/22 HST AHI 62.2 Gian SpO2 75% ***** 1/14/22 LABS Pro  Creatinine 1.04 *****   11/10/21  PFT FVC  2.06 (90) FEV1 1.51 (89) FEF 25-75%  1.04 (75) TLC 4.32 (98) VC  2.04 (89) RV/TLC  53 (115) DLCO 5.15 (29) Non obstructive  normal lung volumes no restrictive disease Decrease diffusion capacity  6MWT 240 meters Baseline SpO2 95% on room air SpO2 dropped to 85%, give 2L O2 End of test SpO2 94% on 2L O2 ***** 10/29/21 ECHO by Dr. Wolfe Normal LV systolic function with EF estimated 65% Basal septal hypertrophy with evidence of JESUS, no significant VLOT obstructions No evidence of LVH Anterior/posterior pericardial fat/fluid layer PASP 78 mmHg Right heart normal in size and structure ***** 11/12/19 FVC  2.34 (99) -> 2.28 (97) FEV1 1.88 (107) -> 1.46 (101) FEV1/FVC  80 -> 78 FEF 25-75% 1.75 (118) -> 1.44 (97) TLC  4.85 (109) VC  2.35 (100) RV  2.50 (121) RV/TLC 52 DLCO  6 (33)  No obstructive disease.  Normal lung volumes Decreased diffusion capacity

## 2025-03-26 NOTE — CONSULT LETTER
[Dear  ___] : Dear  [unfilled], [Consult Letter:] : I had the pleasure of evaluating your patient, [unfilled]. [Please see my note below.] : Please see my note below. [Consult Closing:] : Thank you very much for allowing me to participate in the care of this patient.  If you have any questions, please do not hesitate to contact me. [Sincerely,] : Sincerely, [DrMargo  ___] : Dr. MATOS [FreeTextEntry2] : Dr. Fern Peñaloza\par  [address]\[place] [address]\par  \par  Fax [phone] [FreeTextEntry3] : Glory River DO \par  Director of Pulmonary Hypertension\par  Department of Pulmonary and Critical Care\par  [facility]

## 2025-03-26 NOTE — HISTORY OF PRESENT ILLNESS
[Never] : never [TextBox_4] : Referred by Dr. Peñaloza for Pulmonary Hypertension evaluation in the setting of dilated pulmonary artery, elevated PASP (78 mmHg), worsening dyspnea on exertion and a 6MWT with desaturation to 87% necessitating O2. 2L Her history is significant for limited scleroderma, congestive heart failure with diastolic dysfunction, ground glass opacities on chest CT, multilobar pneumonia, pleural effusion, decreased diffusion capacity of lung and worsenign BRANHAM since COVID booster 11/2021. Concern is for WHO Group II (HFpEF, HTN, LVH, JESUS), III (found to have severe BO AHI 61), at risk for WHO Group I in setting of limited scleroderma. V/Q scan (neg for CTEPH). She did not tolerate CPAP and her most recent echo 9/2022 PASP was 66 mmHg, but RV size/function now normalized, + Grade II diastolic dysfunction and severe LAE. She started Jardiance October 2022.  Had significant Raynauds w/ ulceration and ultimately started on sildenafil, isosorbide was discontinued. Diuretics adjusted for LE edema. She was unable to tolerate PAP for severe BO, is not a candidate for inspire. Found to have pulmonary nodule incidentally when sent for CT A/P, enlarging from 2mm in 2019 to 9mm in 2024 that is not PET avid but likely represents malignancy. Following with serial CT scans for growth as pt opted against surgical resection. Repeat CT performed demonstrating stable size, but worsening pulmonary venous congestion  PH regimen sildenafil 20 mg TID  3-26-25 CT Chest repeated in Feb. Had PFT and 6MWT today.  Worsening swelling of legs, R>L, also with increased SOB that seems noticeable since being placed on MTX by rheum (now off and on prednisone). She also had difficulty with hip pain limiting her 6MWT. Takes lasix 20mg 4 days per week and spironolactone 25mg 3 days per week.

## 2025-03-26 NOTE — REASON FOR VISIT
[Follow-Up] : a follow-up visit [Pulmonary Hypertension] : pulmonary hypertension [Shortness of Breath] : shortness of breath [Language Line ] : provided by [employer]   [TextBox_13] : Dr. Fern Peñaloza [Interpreters_IDNumber] : [id number] [Interpreters_FullName] : Naz

## 2025-03-26 NOTE — ASSESSMENT
[FreeTextEntry1] : 78 yo F with hx of CAD, HTN, HFpEF, pAF, limited scleroderma referred to PH clinic for BRANHAM a/w elevated PASP  1. Pulmonary hypertension- Multifactorial, likely WHO Group I 2/2 scleroderma, WHO Group II PH given HFpEF, severe LAE, HTN, and WHO Group III PH 2/2 severe untreated BO. NYHA FC has improved to I-II (initially III at first visit) with diuresis. She had concern for Barton's sign on prior TTE, but V/Q negative and repeat TTE with normal RV size/function although PASP remains elevated, grade II diastolic dysfunction, and severe LAE.  Her sleep study also confirms severe BO with AHI of 61 and randee SpO2 of 75% however she has been unable to tolerate PAP. She has also subsequently been started on sildenafil for Raynaud's, is at risk for worsening PH-LHD if she is predominantly group II. RHC performed with complex results - she is predominantly precapillary at baseline when well diuresed; however, performed vasoreactivity testing for severe PH and paradoxically had worsening PAWP (20, V waves to 27mmHg) and decrease in CO/CI. Has remained on sildenafil due to severe raynaud's and tolerated well, but now showing evidence of HFpEF (pulmonary venous congestion on CT, worsening SOB, worsening LE edema)  NOT a candidate for increased vasodilators, need to optimize HFpEF - she takes lasix only 4 days/week, this was a regimen started by her prior cardiologist. Will reach out to current cardiologist. Plan to increase lasix to 40mg, trend BMP and NTproBNP. Remains on Jardiance and spironolactone, may need to increase spironolactone to daily as well. 6MWT distance significantly reduced (although limited by hip pain), HTN also significantly worse from prior.   -Increase lasix to 40mg 4 days/week, continue sildenafil and spironolactone -Trend electrolytes and NTproBNP, will send today's note to cardiologist. -Cannot increase vasodilators, but will repeat TTE to reassess PH  2. Enlarging pulmonary nodule - Found incidentally on CT A/P, CT chest with 9mm growing RLL nodule, central and not amenable to CT guided or bronchoscopic bx. Referred to CTS - given age, comorbidities, and severely reduced DLCO she is high risk for surgery. PET scan without uptake (measured on PET to be 7mm). Unclear rate of growth - if continues to enlarge over short period of time (ie 6 months) would recommend resection, but if remains very slow growing would likely opt for monitoring. Repeat CT stable at 6 months (2/2025). Following w/ CT surgery for imaging q 6 months  3. Obstructive Sleep Apnea AHI 62.2  -unable to tolerate treatment, this increases her risk of cardiopulmonary disease but she understands. Has some night time awakenings with SOB, but no alternative treatment available for her. Not a candidate for surgical intervention. Recommend she continue nocturnal oxygen supplementation for significantly increased time spent hypoxic <90% at night.  RTC 1 month for reassessment after diuresis

## 2025-03-26 NOTE — PHYSICAL EXAM
[No Acute Distress] : no acute distress [Normal Oropharynx] : normal oropharynx [Normal Appearance] : normal appearance [No Neck Mass] : no neck mass [Normal Rate/Rhythm] : normal rate/rhythm [Normal S1, S2] : normal s1, s2 [No Murmurs] : no murmurs [No Resp Distress] : no resp distress [Clear to Auscultation Bilaterally] : clear to auscultation bilaterally [No Abnormalities] : no abnormalities [Benign] : benign [No Clubbing] : no clubbing [No Cyanosis] : no cyanosis [FROM] : FROM [2+ Pitting] : 2+ pitting [Normal Color/ Pigmentation] : normal color/ pigmentation [No Focal Deficits] : no focal deficits [Oriented x3] : oriented x3 [Normal Affect] : normal affect [TextBox_105] : Asymmetric 2+ pitting edema even with compression stockings, L>R (history of L TKA) [TextBox_125] : left 4th digit s/p DIP joint resected for Raynaud's; no exudate or wheeping, tender but area surrounding is not warm

## 2025-03-26 NOTE — PROCEDURE
[FreeTextEntry1] : PFT 3/26/25 - FVC 1.84 (80%), FEV1 1.28 (75%), FEV1/FVC 70%, TLC 3.05 (69%), DLCO 6.96 (38%) 6MWT 128m, SpO2 97% --> 90%, /81 --> 191/100  CT Chest 02/24/25: 1. New mild probable pulmonary venous congestion with minimal interlobular septal thickening and new bibasilar trace pleural effusions. 2. Solid 7 mm nodule within the medial basal segment of the right lower lobe which may represent a benign hamartoma. It appears essentially unchanged in size when compared to 3/6/2024 however larger in size when compared to 11/1/2021. Continued short interval surveillance in 6-12 months and/or a follow-up whole-body PET/CT to confirm benignity. 3. Increased number of predominantly nonenlarged mediastinal lymph nodes which are thought to be reactive in etiology. ***** CT Chest non-con 8/17/24: Impression: 1. No developing suspicious parenchymal nodules or masses. There is a 6 mm solid nodule within the medial basal segment of the right lower lobe unchanged from 4/29/2024 and 3/6/2024. It appears more conspicuous in size when compared to 11/11/2021 when it measured 4 mm. No visible uptake of this nodule in whole-body PET dated 4/29/2024. Continued short interval surveillance of this nodule in 6-12 months is suggested to confirm stability. 2. No other significant interval changes. ***** PFT 4/2024 FVC 2.08 (89%) --> 2.08  FEV1 1.50 (87%) --> 1.55 FEV1/FVC 72% TLC 3.24 (73%) DLCO 6.22 (34%)  6MWT 210 meters, SpO2 99% --> 93% on RA, HR 56 --> 72, /85 --> 170/84  DLCO severely reduced although stable from 1 year ago, lung volumes unchanged, walk distance stable and no longer hypoxic with exertion.  *** PET-CT performed on 4/29/24: 7 mm nodule at the basilar right lower lobe does not demonstrate visible uptake No hypermetabolic pulmonary nodules. ***** 3/6/24 CT Chest at [facility] Enlarging RLL nodule 2mm -> 9 mm Enlarged PA Stable mildly patulous thoracic esophagus with questionable h/o sceroderma Decreased pleural effusions Stable borderline mediastinal adenopathy Moderate coronary artery calcifications *****  11/8/23 ECHO 1. Mild symmetric left ventricular hypertrophy.  2. Normal left ventricular systolic function.  3. Grade II left ventricular diastolic dysfunction.  4. Normal right ventricular size and systolic function.  5. Dilated left atrium.  6. Mild-to-moderate mitral regurgitation.  7. Aortic sclerosis without significant stenosis.  8. Pulmonary artery systolic pressure is 38 mmHg.  9. Trivial pericardial effusion. 10. 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 This was done OFF sildenafil. F/U in PH clinic **** 3/29/22 PFT and 6MWT  FVC 2.06 (87) FEV1 1.59 (90) FEV1/FVC 77 FEF 25-75% 1.34 (94) TLC 3.35 (76) VC 2.06 (87) RV/TLC 38.64 (86) DLCO 5.94 (32)  225 meters Resting SpO2 94% on room air With 3 min 10 sec remaining SpO2 dropped to 88% on room air. Pt took a short break and continued walking End test SpO2 88% on room air Hemant 3 ***** 3/21/23 ECHO  1. Normal left ventricular size and systolic function.  2. Grade II left ventricular diastolic dysfunction.  3. Normal right ventricular size and systolic function.  4. Severely dilated left atrium.  5. Aortic sclerosis without significant stenosis.  6. Mild mitral regurgitation.  7. Pulmonary hypertension present, pulmonary artery systolic pressure is 41 mmHg.  8. Trivial pericardial effusion.  9. Compared to the previous TTE performed on 9/13/2022, PASP is lower in this study. ***** 10/3/22 i [employer] Connect APAP Compliance Data 6/78 days usage (7.7% compliance) avg use 1 hr 7 min ***** 9/13/22 ECHO 1. Normal left ventricular size and systolic function. 2. Grade II left ventricular diastolic dysfunction. 3. Normal right ventricular size and systolic function. 4. Severely dilated left atrium. 5. Mild mitral regurgitation. 6. Mild tricuspid regurgitation. 7. Pulmonary hypertension present, pulmonary artery systolic pressure is 66 mmHg. 8. No pericardial effusion. 9. Compared to the previous TTE performed on 2/18/2022, right ventricular function is normal and there is severe pulmonary hypertension ***** 5/23/22 LABS NT pro  ***** 2/18/22 V/Q scan No evidence of pulmonary emboli ***** 2/18/22 ECHO  1. Normal left ventricular cavity size and systolic function.  2. Grade III left ventricular diastolic dysfunction with elevated  filling pressure.  3. Mildly dilated right ventricle.  4. Moderately reduced right ventricular systolic function with apical  sparing consistent with possible pulmonary embolism (Barton's sign).  5. Mildly dilated left atrium.  6. Aortic sclerosis without significant stenosis.  7. Mild mitral regurgitation.  8. Mild tricuspid regurgitation.  9. Pulmonary hypertension present, pulmonary artery systolic pressure is  45 mmHg. 10. No pericardial effusion ***** 1/25/22 HST AHI 62.2 Gian SpO2 75% ***** 1/14/22 LABS Pro  Creatinine 1.04 *****   11/10/21  PFT FVC  2.06 (90) FEV1 1.51 (89) FEF 25-75%  1.04 (75) TLC 4.32 (98) VC  2.04 (89) RV/TLC  53 (115) DLCO 5.15 (29) Non obstructive  normal lung volumes no restrictive disease Decrease diffusion capacity  6MWT 240 meters Baseline SpO2 95% on room air SpO2 dropped to 85%, give 2L O2 End of test SpO2 94% on 2L O2 ***** 10/29/21 ECHO by Dr. Wolfe Normal LV systolic function with EF estimated 65% Basal septal hypertrophy with evidence of JESUS, no significant VLOT obstructions No evidence of LVH Anterior/posterior pericardial fat/fluid layer PASP 78 mmHg Right heart normal in size and structure ***** 11/12/19 FVC  2.34 (99) -> 2.28 (97) FEV1 1.88 (107) -> 1.46 (101) FEV1/FVC  80 -> 78 FEF 25-75% 1.75 (118) -> 1.44 (97) TLC  4.85 (109) VC  2.35 (100) RV  2.50 (121) RV/TLC 52 DLCO  6 (33)  No obstructive disease.  Normal lung volumes Decreased diffusion capacity

## 2025-03-26 NOTE — CONSULT LETTER
[Dear  ___] : Dear  [unfilled], [Consult Letter:] : I had the pleasure of evaluating your patient, [unfilled]. [Please see my note below.] : Please see my note below. [Consult Closing:] : Thank you very much for allowing me to participate in the care of this patient.  If you have any questions, please do not hesitate to contact me. [Sincerely,] : Sincerely, [DrMargo  ___] : Dr. MATOS [FreeTextEntry2] : Dr. Fern Peñaloza\par  [address]\[place] [address]\par  \par  Fax [phone] [FreeTextEntry3] : Glory River DO \par  Director of Pulmonary Hypertension\par  Department of Pulmonary and Critical Care\par  [facility]

## 2025-03-26 NOTE — REASON FOR VISIT
[Follow-Up] : a follow-up visit [Pulmonary Hypertension] : pulmonary hypertension [Shortness of Breath] : shortness of breath [Language Line ] : provided by Language Line   [TextBox_13] : Dr. Fern Peñaloza [Interpreters_IDNumber] : [id number] [Interpreters_FullName] : Naz

## 2025-04-22 NOTE — PROCEDURE
[FreeTextEntry1] : 4/4/25 ECHO 1. Left ventricular cavity is normal in size. Left ventricular systolic function is normal with an ejection fraction of 66 % by Sherwood's method of disks. There are no regional wall motion abnormalities seen. 2. There is severe (grade 3) left ventricular diastolic dysfunction. 3. Normal right ventricular cavity size, with normal wall thickness, and normal right ventricular systolic function. 4. Left atrium is moderately dilated. 5. The right atrium is dilated. 6. Mild to moderate tricuspid regurgitation. 7. Estimated pulmonary artery systolic pressure is 28 mmHg. This is likely underestimated. 8. Mild left ventricular hypertrophy. 9. Small pericardial effusion. **** PFT 3/26/25 - FVC 1.84 (80%), FEV1 1.28 (75%), FEV1/FVC 70%, TLC 3.05 (69%), DLCO 6.96 (38%) 6MWT 128m, SpO2 97% --> 90%, /81 --> 191/100 *** CT Chest 02/24/25: 1. New mild probable pulmonary venous congestion with minimal interlobular septal thickening and new bibasilar trace pleural effusions. 2. Solid 7 mm nodule within the medial basal segment of the right lower lobe which may represent a benign hamartoma. It appears essentially unchanged in size when compared to 3/6/2024 however larger in size when compared to 11/1/2021. Continued short interval surveillance in 6-12 months and/or a follow-up whole-body PET/CT to confirm benignity. 3. Increased number of predominantly nonenlarged mediastinal lymph nodes which are thought to be reactive in etiology. ***** CT Chest non-con 8/17/24: Impression: 1. No developing suspicious parenchymal nodules or masses. There is a 6 mm solid nodule within the medial basal segment of the right lower lobe unchanged from 4/29/2024 and 3/6/2024. It appears more conspicuous in size when compared to 11/11/2021 when it measured 4 mm. No visible uptake of this nodule in whole-body PET dated 4/29/2024. Continued short interval surveillance of this nodule in 6-12 months is suggested to confirm stability. 2. No other significant interval changes. ***** PFT 4/2024 FVC 2.08 (89%) --> 2.08  FEV1 1.50 (87%) --> 1.55 FEV1/FVC 72% TLC 3.24 (73%) DLCO 6.22 (34%)  6MWT 210 meters, SpO2 99% --> 93% on RA, HR 56 --> 72, /85 --> 170/84  DLCO severely reduced although stable from 1 year ago, lung volumes unchanged, walk distance stable and no longer hypoxic with exertion.  *** PET-CT performed on 4/29/24: 7 mm nodule at the basilar right lower lobe does not demonstrate visible uptake No hypermetabolic pulmonary nodules. ***** 3/6/24 CT Chest at [facility] Enlarging RLL nodule 2mm -> 9 mm Enlarged PA Stable mildly patulous thoracic esophagus with questionable h/o sceroderma Decreased pleural effusions Stable borderline mediastinal adenopathy Moderate coronary artery calcifications *****  11/8/23 ECHO 1. Mild symmetric left ventricular hypertrophy.  2. Normal left ventricular systolic function.  3. Grade II left ventricular diastolic dysfunction.  4. Normal right ventricular size and systolic function.  5. Dilated left atrium.  6. Mild-to-moderate mitral regurgitation.  7. Aortic sclerosis without significant stenosis.  8. Pulmonary artery systolic pressure is 38 mmHg.  9. Trivial pericardial effusion. 10. 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 This was done OFF sildenafil. F/U in PH clinic **** 3/29/22 PFT and 6MWT  FVC 2.06 (87) FEV1 1.59 (90) FEV1/FVC 77 FEF 25-75% 1.34 (94) TLC 3.35 (76) VC 2.06 (87) RV/TLC 38.64 (86) DLCO 5.94 (32)  225 meters Resting SpO2 94% on room air With 3 min 10 sec remaining SpO2 dropped to 88% on room air. Pt took a short break and continued walking End test SpO2 88% on room air Hemant 3 ***** 3/21/23 ECHO  1. Normal left ventricular size and systolic function.  2. Grade II left ventricular diastolic dysfunction.  3. Normal right ventricular size and systolic function.  4. Severely dilated left atrium.  5. Aortic sclerosis without significant stenosis.  6. Mild mitral regurgitation.  7. Pulmonary hypertension present, pulmonary artery systolic pressure is 41 mmHg.  8. Trivial pericardial effusion.  9. Compared to the previous TTE performed on 9/13/2022, PASP is lower in this study. ***** 10/3/22 i [employer] Connect APAP Compliance Data 6/78 days usage (7.7% compliance) avg use 1 hr 7 min ***** 9/13/22 ECHO 1. Normal left ventricular size and systolic function. 2. Grade II left ventricular diastolic dysfunction. 3. Normal right ventricular size and systolic function. 4. Severely dilated left atrium. 5. Mild mitral regurgitation. 6. Mild tricuspid regurgitation. 7. Pulmonary hypertension present, pulmonary artery systolic pressure is 66 mmHg. 8. No pericardial effusion. 9. Compared to the previous TTE performed on 2/18/2022, right ventricular function is normal and there is severe pulmonary hypertension ***** 5/23/22 LABS NT pro  ***** 2/18/22 V/Q scan No evidence of pulmonary emboli ***** 2/18/22 ECHO  1. Normal left ventricular cavity size and systolic function.  2. Grade III left ventricular diastolic dysfunction with elevated  filling pressure.  3. Mildly dilated right ventricle.  4. Moderately reduced right ventricular systolic function with apical  sparing consistent with possible pulmonary embolism (Barton's sign).  5. Mildly dilated left atrium.  6. Aortic sclerosis without significant stenosis.  7. Mild mitral regurgitation.  8. Mild tricuspid regurgitation.  9. Pulmonary hypertension present, pulmonary artery systolic pressure is  45 mmHg. 10. No pericardial effusion ***** 1/25/22 HST AHI 62.2 Gian SpO2 75% ***** 1/14/22 LABS Pro  Creatinine 1.04 *****   11/10/21  PFT FVC  2.06 (90) FEV1 1.51 (89) FEF 25-75%  1.04 (75) TLC 4.32 (98) VC  2.04 (89) RV/TLC  53 (115) DLCO 5.15 (29) Non obstructive  normal lung volumes no restrictive disease Decrease diffusion capacity  6MWT 240 meters Baseline SpO2 95% on room air SpO2 dropped to 85%, give 2L O2 End of test SpO2 94% on 2L O2 ***** 10/29/21 ECHO by Dr. Wolfe Normal LV systolic function with EF estimated 65% Basal septal hypertrophy with evidence of JESUS, no significant VLOT obstructions No evidence of LVH Anterior/posterior pericardial fat/fluid layer PASP 78 mmHg Right heart normal in size and structure ***** 11/12/19 FVC  2.34 (99) -> 2.28 (97) FEV1 1.88 (107) -> 1.46 (101) FEV1/FVC  80 -> 78 FEF 25-75% 1.75 (118) -> 1.44 (97) TLC  4.85 (109) VC  2.35 (100) RV  2.50 (121) RV/TLC 52 DLCO  6 (33)  No obstructive disease.  Normal lung volumes Decreased diffusion capacity

## 2025-04-22 NOTE — REASON FOR VISIT
[Follow-Up] : a follow-up visit [Pulmonary Hypertension] : pulmonary hypertension [Shortness of Breath] : shortness of breath [Language Line ] : provided by Language Line   [TextBox_13] : Dr. Fern Peñaloza [Interpreters_IDNumber] : [id number] [Interpreters_FullName] : Naz

## 2025-04-22 NOTE — CONSULT LETTER
[Dear  ___] : Dear  [unfilled], [Consult Letter:] : I had the pleasure of evaluating your patient, [unfilled]. [Please see my note below.] : Please see my note below. [Consult Closing:] : Thank you very much for allowing me to participate in the care of this patient.  If you have any questions, please do not hesitate to contact me. [Sincerely,] : Sincerely, [DrMargo  ___] : Dr. MATOS [FreeTextEntry2] : Dr. Fern Peñaloza\par  [address]\[place] [address]\par  \par  Fax [phone] [FreeTextEntry3] : Glory River DO \par  Director of Pulmonary Hypertension\par  Department of Pulmonary and Critical Care\par  [facility]

## 2025-04-22 NOTE — REASON FOR VISIT
[Follow-Up] : a follow-up visit [Pulmonary Hypertension] : pulmonary hypertension [Shortness of Breath] : shortness of breath [Language Line ] : provided by Language Line   [TextBox_13] : Dr. Fern Peñaloza [Interpreters_IDNumber] : [id number] [Interpreters_FullName] : Naz

## 2025-04-22 NOTE — CONSULT LETTER
[Dear  ___] : Dear  [unfilled], [Consult Letter:] : I had the pleasure of evaluating your patient, [unfilled]. [Please see my note below.] : Please see my note below. [Consult Closing:] : Thank you very much for allowing me to participate in the care of this patient.  If you have any questions, please do not hesitate to contact me. [Sincerely,] : Sincerely, [DrMargo  ___] : Dr. MATOS [FreeTextEntry2] : Dr. Fern Peñaloza\par  [address]\[place] [address]\par  \par  Fax [phone] [FreeTextEntry3] : Glory River DO \par  Director of Pulmonary Hypertension\par  Department of Pulmonary and Critical Care\par  [facility]

## 2025-04-22 NOTE — PHYSICAL EXAM
[No Acute Distress] : no acute distress [Normal Oropharynx] : normal oropharynx [Normal Appearance] : normal appearance [No Neck Mass] : no neck mass [Normal Rate/Rhythm] : normal rate/rhythm [Normal S1, S2] : normal s1, s2 [No Murmurs] : no murmurs [No Resp Distress] : no resp distress [Clear to Auscultation Bilaterally] : clear to auscultation bilaterally [No Abnormalities] : no abnormalities [Benign] : benign [No Clubbing] : no clubbing [No Cyanosis] : no cyanosis [FROM] : FROM [Normal Color/ Pigmentation] : normal color/ pigmentation [2+ Pitting] : 2+ pitting [No Focal Deficits] : no focal deficits [Oriented x3] : oriented x3 [Normal Affect] : normal affect [TextBox_105] : Asymmetric 2+ pitting edema even with compression stockings, L>R (history of L TKA) [TextBox_125] : left 4th digit s/p DIP joint resected for Raynaud's; no exudate or wheeping, tender but area surrounding is not warm

## 2025-04-22 NOTE — HISTORY OF PRESENT ILLNESS
[Never] : never [TextBox_4] : Referred by Dr. Peñaloza for Pulmonary Hypertension evaluation in the setting of dilated pulmonary artery, elevated PASP (78 mmHg), worsening dyspnea on exertion and a 6MWT with desaturation to 87% necessitating O2. 2L Her history is significant for limited scleroderma, congestive heart failure with diastolic dysfunction, ground glass opacities on chest CT, multilobar pneumonia, pleural effusion, decreased diffusion capacity of lung and worsenign BRANHAM since COVID booster 11/2021. Concern is for WHO Group II (HFpEF, HTN, LVH, JESUS), III (found to have severe BO AHI 61), at risk for WHO Group I in setting of limited scleroderma. V/Q scan (neg for CTEPH). She did not tolerate CPAP and her most recent echo 9/2022 PASP was 66 mmHg, but RV size/function now normalized, + Grade II diastolic dysfunction and severe LAE. She started Jardiance October 2022.  Had significant Raynauds w/ ulceration and ultimately started on sildenafil, isosorbide was discontinued. Diuretics adjusted for LE edema. She was unable to tolerate PAP for severe BO, is not a candidate for inspire. Found to have pulmonary nodule incidentally when sent for CT A/P, enlarging from 2mm in 2019 to 9mm in 2024 that is not PET avid but likely represents malignancy. Following with serial CT scans for growth as pt opted against surgical resection. Repeat CT performed demonstrating stable size, but worsening pulmonary venous congestion  PH regimen sildenafil 20 mg TID  4-22-25 Her LE edema improved and her breathing is much better after increasing her Lasix dose. But then hospitalized for bacteremia (E coli) 2/2 GI infection w/ profuse diarrhea, diuretic dose decreased. There was ? pna as well due to CXR suggesting LLL infiltrate but CT chest with NO infiltrate, likely all infection 2/2 GI involvement given e coli. Had EGD, esophageal dysmotility likely due to scleroderma  At arrival to visit noted acute worsening SOB - had been on lower lasix 20mg dose at home, then noted today her weight increased acutely 4-5 lbs from yesterday to today, SOB much worse although edema not terrible (for her). Took 40mg of lasix this AM and has urinated multiple times, this has already improved her symptoms although still some BRANHAM. Denies chest pain, palpitations, chills, cough. Walking is very difficult but not just because of breathing, has "neuropathy" of LE and poor balance

## 2025-04-22 NOTE — ASSESSMENT
[FreeTextEntry1] : 78 yo F with hx of CAD, HTN, HFpEF, pAF, limited scleroderma referred to PH clinic for BRANHAM a/w elevated PASP  1. Pulmonary hypertension- Multifactorial, likely WHO Group I 2/2 scleroderma, WHO Group II PH given HFpEF, severe LAE, HTN, and WHO Group III PH 2/2 severe untreated BO. NYHA FC has improved to I-II (initially III at first visit) with diuresis. She had concern for Braton's sign on prior TTE, but V/Q negative and repeat TTE with normal RV size/function although PASP remains elevated, grade II diastolic dysfunction, and severe LAE.  Her sleep study also confirms severe BO with AHI of 61 and randee SpO2 of 75% however she has been unable to tolerate PAP. She has also subsequently been started on sildenafil for Raynaud's, is at risk for worsening PH-LHD if she is predominantly group II. RHC performed with complex results - she is predominantly precapillary at baseline when well diuresed; however, performed vasoreactivity testing for severe PH and paradoxically had worsening PAWP (20, V waves to 27mmHg) and decrease in CO/CI. Has remained on sildenafil due to severe raynaud's and tolerated well, but now showing evidence of HFpEF (pulmonary venous congestion on CT, worsening SOB, worsening LE edema), TTE 4/4/25 with severe (grade 3) dyastolic dysfunction, normal RV size/function, mild LVH  NOT a candidate for increased vasodilators, need to optimize HFpEF - she had responded well to ncrease lasix to 40mg, recent admission with dose adjustment. Advise to resume 40mg lasix daily and nguyen daily until weight below 150lbs. Remains on Jardiance. NTproBNP was elevated, she is asking to hold off on labs as she is covered in bruises from recent admission.    -Increase lasix to 40mg daily and spironolactone daily until weight 150lbs, can then continue lasix 20mg + nguyen daily -Trend electrolytes and NTproBNP at next visit -Cannot increase vasodilators, can consider repeat RHC if uncertainty re: group II - Will have her seen in HFpEF/PH clinic to see if there is any indication to assess for infiltrative disease given worsening diastology and neuropathy  2. Enlarging pulmonary nodule - Found incidentally on CT A/P, CT chest with 9mm growing RLL nodule, central and not amenable to CT guided or bronchoscopic bx. Referred to CTS - given age, comorbidities, and severely reduced DLCO she is high risk for surgery. PET scan without uptake (measured on PET to be 7mm). Unclear rate of growth - if continues to enlarge over short period of time (ie 6 months) would recommend resection, but if remains very slow growing would likely opt for monitoring. Repeat CT stable at 6 months (2/2025). Following w/ CT surgery for imaging q 6 months  3. Obstructive Sleep Apnea AHI 62.2  -unable to tolerate treatment, this increases her risk of cardiopulmonary disease but she understands. Has some night time awakenings with SOB, but no alternative treatment available for her. Not a candidate for surgical intervention. Recommend she continue nocturnal oxygen supplementation for significantly increased time spent hypoxic <90% at night.  RTC next month - HFpEF/PH clinic, will have virtual check in with Jie DICKSON) later this week to assess weight on higher dose lasix.

## 2025-04-22 NOTE — ASSESSMENT
[FreeTextEntry1] : 80 yo F with hx of CAD, HTN, HFpEF, pAF, limited scleroderma referred to PH clinic for BRANHAM a/w elevated PASP  1. Pulmonary hypertension- Multifactorial, likely WHO Group I 2/2 scleroderma, WHO Group II PH given HFpEF, severe LAE, HTN, and WHO Group III PH 2/2 severe untreated BO. NYHA FC has improved to I-II (initially III at first visit) with diuresis. She had concern for Barton's sign on prior TTE, but V/Q negative and repeat TTE with normal RV size/function although PASP remains elevated, grade II diastolic dysfunction, and severe LAE.  Her sleep study also confirms severe BO with AHI of 61 and randee SpO2 of 75% however she has been unable to tolerate PAP. She has also subsequently been started on sildenafil for Raynaud's, is at risk for worsening PH-LHD if she is predominantly group II. RHC performed with complex results - she is predominantly precapillary at baseline when well diuresed; however, performed vasoreactivity testing for severe PH and paradoxically had worsening PAWP (20, V waves to 27mmHg) and decrease in CO/CI. Has remained on sildenafil due to severe raynaud's and tolerated well, but now showing evidence of HFpEF (pulmonary venous congestion on CT, worsening SOB, worsening LE edema), TTE 4/4/25 with severe (grade 3) dyastolic dysfunction, normal RV size/function, mild LVH  NOT a candidate for increased vasodilators, need to optimize HFpEF - she had responded well to ncrease lasix to 40mg, recent admission with dose adjustment. Advise to resume 40mg lasix daily and nguyen daily until weight below 150lbs. Remains on Jardiance. NTproBNP was elevated, she is asking to hold off on labs as she is covered in bruises from recent admission.    -Increase lasix to 40mg daily and spironolactone daily until weight 150lbs, can then continue lasix 20mg + nguyen daily -Trend electrolytes and NTproBNP at next visit -Cannot increase vasodilators, can consider repeat RHC if uncertainty re: group II - Will have her seen in HFpEF/PH clinic to see if there is any indication to assess for infiltrative disease given worsening diastology and neuropathy  2. Enlarging pulmonary nodule - Found incidentally on CT A/P, CT chest with 9mm growing RLL nodule, central and not amenable to CT guided or bronchoscopic bx. Referred to CTS - given age, comorbidities, and severely reduced DLCO she is high risk for surgery. PET scan without uptake (measured on PET to be 7mm). Unclear rate of growth - if continues to enlarge over short period of time (ie 6 months) would recommend resection, but if remains very slow growing would likely opt for monitoring. Repeat CT stable at 6 months (2/2025). Following w/ CT surgery for imaging q 6 months  3. Obstructive Sleep Apnea AHI 62.2  -unable to tolerate treatment, this increases her risk of cardiopulmonary disease but she understands. Has some night time awakenings with SOB, but no alternative treatment available for her. Not a candidate for surgical intervention. Recommend she continue nocturnal oxygen supplementation for significantly increased time spent hypoxic <90% at night.  RTC next month - HFpEF/PH clinic, will have virtual check in with Jie DICKSON) later this week to assess weight on higher dose lasix.

## 2025-04-25 NOTE — PROCEDURE
[FreeTextEntry1] : 4/4/25 ECHO 1. Left ventricular cavity is normal in size. Left ventricular systolic function is normal with an ejection fraction of 66 % by Sherwood's method of disks. There are no regional wall motion abnormalities seen. 2. There is severe (grade 3) left ventricular diastolic dysfunction. 3. Normal right ventricular cavity size, with normal wall thickness, and normal right ventricular systolic function. 4. Left atrium is moderately dilated. 5. The right atrium is dilated. 6. Mild to moderate tricuspid regurgitation. 7. Estimated pulmonary artery systolic pressure is 28 mmHg. This is likely underestimated. 8. Mild left ventricular hypertrophy. 9. Small pericardial effusion. **** PFT 3/26/25 - FVC 1.84 (80%), FEV1 1.28 (75%), FEV1/FVC 70%, TLC 3.05 (69%), DLCO 6.96 (38%) 6MWT 128m, SpO2 97% --> 90%, /81 --> 191/100 *** CT Chest 02/24/25: 1. New mild probable pulmonary venous congestion with minimal interlobular septal thickening and new bibasilar trace pleural effusions. 2. Solid 7 mm nodule within the medial basal segment of the right lower lobe which may represent a benign hamartoma. It appears essentially unchanged in size when compared to 3/6/2024 however larger in size when compared to 11/1/2021. Continued short interval surveillance in 6-12 months and/or a follow-up whole-body PET/CT to confirm benignity. 3. Increased number of predominantly nonenlarged mediastinal lymph nodes which are thought to be reactive in etiology.  *** PET-CT performed on 4/29/24: 7 mm nodule at the basilar right lower lobe does not demonstrate visible uptake No hypermetabolic pulmonary nodules. ***** RHC 8/3/23 Vitals: HR 67, /70/100, SpO2 %  Baseline RHC: RA - 6 RV - 63/9 PA - 65/28/43 PAWP - 15, excessive respiratory variation CO/CI (Td) - 5.74/3.34 TPG - 28, DPG - 13, PVR - 4.9WU SVR - 1310, PVR/SVR - 0.3  Shaun PA 58/26/39 PAWP 20 (V waves to 27) CO/CI - 4.87/2.83 TPG - 19, DPG - 6 PVR - 3.9  Impression - moderate pre capillary pulmonary hypertension at baseline, attempted vasoreactivity study - NOT vasoreactive and found to have worsening PAWP w/ V waves to 27 after Shaun as well as drop in CO/CI (though still normal), no hypoxia. 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 This was done OFF sildenafil. F/U in PH clinic ***** 2/18/22 V/Q scan No evidence of pulmonary emboli ***** 1/25/22 HST AHI 62.2 Gian SpO2 75% *****

## 2025-04-25 NOTE — ASSESSMENT
[FreeTextEntry1] : 80 yo F with hx of CAD, HTN, HFpEF, pAF, limited scleroderma referred to PH clinic for BRANHAM a/w elevated PASP  1. Pulmonary hypertension- Multifactorial, likely WHO Group I 2/2 scleroderma, WHO Group II PH given HFpEF, severe LAE, HTN, and WHO Group III PH 2/2 severe untreated BO. NYHA FC has improved to I-II (initially III at first visit) with diuresis. She had concern for Barton's sign on prior TTE, but V/Q negative and repeat TTE with normal RV size/function although PASP remains elevated, grade II diastolic dysfunction, and severe LAE.  Her sleep study also confirms severe BO with AHI of 61 and randee SpO2 of 75% however she has been unable to tolerate PAP. She has also subsequently been started on sildenafil for Raynaud's, is at risk for worsening PH-LHD if she is predominantly group II. RHC performed with complex results - she is predominantly precapillary at baseline when well diuresed; however, performed vasoreactivity testing for severe PH and paradoxically had worsening PAWP (20, V waves to 27mmHg) and decrease in CO/CI. Has remained on sildenafil due to severe raynaud's and tolerated well, but now showing evidence of HFpEF (pulmonary venous congestion on CT, worsening SOB, worsening LE edema), TTE 4/4/25 with severe (grade 3) dyastolic dysfunction, normal RV size/function, mild LVH  NOT a candidate for increased vasodilators, need to optimize HFpEF - she had responded well to increase lasix to 40mg, recent admission with dose adjustment. She has been taking 40mg lasix daily and nguyen daily, weight is at 150lbs. She is nervous about continuing this dose for much longer. Remains on Jardiance. Last NTproBNP was elevated, she asked to hold off on labs as she is covered in bruises from recent admission.    -Take lasix 40mg daily and spironolactone daily for another 2 days then return to lasix 20 mg daily + spironolactone. If weight > 150lbs or increased edema increase lasix back up to 40 mg for a few days.  -Trend electrolytes and NTproBNP at next visit -Cannot increase vasodilators, can consider repeat RHC if uncertainty re: group II - Will have her seen in HFpEF/PH clinic to see if there is any indication to assess for infiltrative disease given worsening diastology and neuropathy  2. Enlarging pulmonary nodule - Found incidentally on CT A/P, CT chest with 9mm growing RLL nodule, central and not amenable to CT guided or bronchoscopic bx. Referred to CTS - given age, comorbidities, and severely reduced DLCO she is high risk for surgery. PET scan without uptake (measured on PET to be 7mm). Unclear rate of growth - if continues to enlarge over short period of time (ie 6 months) would recommend resection, but if remains very slow growing would likely opt for monitoring. Repeat CT stable at 6 months (2/2025). Following w/ CT surgery for imaging q 6 months  3. Obstructive Sleep Apnea AHI 62.2  -unable to tolerate treatment, this increases her risk of cardiopulmonary disease but she understands. Has some night time awakenings with SOB, but no alternative treatment available for her. Not a candidate for surgical intervention. Recommend she continue nocturnal oxygen supplementation for significantly increased time spent hypoxic <90% at night.  RTC 5/2 - HFpEF/PH clinic

## 2025-04-25 NOTE — HISTORY OF PRESENT ILLNESS
[TextBox_4] : Referred by Dr. Peñaloza for Pulmonary Hypertension evaluation in the setting of dilated pulmonary artery, elevated PASP (78 mmHg), worsening dyspnea on exertion and a 6MWT with desaturation to 87% necessitating O2. 2L Her history is significant for limited scleroderma, congestive heart failure with diastolic dysfunction, ground glass opacities on chest CT, multilobar pneumonia, pleural effusion, decreased diffusion capacity of lung and worsenign BRANHAM since COVID booster 11/2021. Concern is for WHO Group II (HFpEF, HTN, LVH, JESUS), III (found to have severe BO AHI 61), at risk for WHO Group I in setting of limited scleroderma. V/Q scan (neg for CTEPH). She did not tolerate CPAP and her most recent echo 9/2022 PASP was 66 mmHg, but RV size/function now normalized, + Grade II diastolic dysfunction and severe LAE. She started Jardiance October 2022.  Had significant Raynauds w/ ulceration and ultimately started on sildenafil, isosorbide was discontinued. Diuretics adjusted for LE edema. She was unable to tolerate PAP for severe BO, is not a candidate for inspire. Found to have pulmonary nodule incidentally when sent for CT A/P, enlarging from 2mm in 2019 to 9mm in 2024 that is not PET avid but likely represents malignancy. Following with serial CT scans for growth as pt opted against surgical resection. Repeat CT performed demonstrating stable size, but worsening pulmonary venous congestion  PH regimen sildenafil 20 mg TID  4-25-25 She has been taking 40 mg furosemide since 4/22 and her swelling is "much less". She feels her lower extremities are "almost good". Her weight is 150 pounds. She feels "so-so but not worse"

## 2025-05-02 NOTE — CONSULT LETTER
[Dear  ___] : Dear  [unfilled], [Consult Letter:] : I had the pleasure of evaluating your patient, [unfilled]. [Please see my note below.] : Please see my note below. [Consult Closing:] : Thank you very much for allowing me to participate in the care of this patient.  If you have any questions, please do not hesitate to contact me. [Sincerely,] : Sincerely, [DrMargo  ___] : Dr. MATOS [FreeTextEntry2] : Dr. Fern Peñaloza\par  [address]\[place] [address]\par  \par  Fax [phone] [FreeTextEntry3] : Glory River DO \par  Director of Pulmonary Hypertension\par  Department of Pulmonary and Critical Care\par  [facility]

## 2025-05-02 NOTE — PHYSICAL EXAM
[Well Developed] : well developed [Well Nourished] : well nourished [Normal Venous Pressure] : normal venous pressure [Normal S1, S2] : normal S1, S2 [No Murmur] : no murmur [Clear Lung Fields] : clear lung fields [Moves all extremities] : moves all extremities [No Focal Deficits] : no focal deficits [Alert and Oriented] : alert and oriented [Normal memory] : normal memory [de-identified] : No Ananyamauls  [de-identified] : Anne-Marie, trace edema to ankles

## 2025-05-02 NOTE — ASSESSMENT
[FreeTextEntry1] : 78 yo F with hx of CAD, HTN, HFpEF, pAF, limited scleroderma referred to PH clinic for BRANHAM a/w elevated PASP  1. Pulmonary hypertension- Multifactorial, likely WHO Group I 2/2 scleroderma, WHO Group II PH given HFpEF, severe LAE, HTN, and WHO Group III PH 2/2 severe untreated BO. NYHA FC has improved to I-II (initially III at first visit) with diuresis. She had concern for Barton's sign on prior TTE, but V/Q negative and repeat TTE with normal RV size/function although PASP remains elevated, grade II diastolic dysfunction, and severe LAE.  Her sleep study also confirms severe BO with AHI of 61 and randee SpO2 of 75% however she has been unable to tolerate PAP. She has also subsequently been started on sildenafil for Raynaud's, is at risk for worsening PH-LHD if she is predominantly group II. RHC performed with complex results - she is predominantly precapillary at baseline when well diuresed; however, performed vasoreactivity testing for severe PH and paradoxically had worsening PAWP (20, V waves to 27mmHg) and decrease in CO/CI. Has remained on sildenafil due to severe raynaud's and tolerated well, but now showing evidence of HFpEF (pulmonary venous congestion on CT, worsening SOB, worsening LE edema), TTE 4/4/25 with severe (grade 3) dyastolic dysfunction, normal RV size/function, mild LVH  NOT a candidate for increased vasodilators, need to optimize HFpEF - she had responded well to increase lasix to 40mg, recent admission with dose adjustment. She has been taking 40mg lasix daily and nguyen daily, weight is at 150lbs. She is nervous about continuing this dose for much longer. Remains on Jardiance.     - lasix 20 mg daily + spironolactone. If weight > 150lbs or increased edema increase lasix back up to 40 mg for a few days. F/U HF recommendations -Trend electrolytes and NTproBNP today -Cannot increase vasodilators, can consider repeat RHC if uncertainty re: group II - Cardiac MRI as she is at risk for restrictive CM i/s/o scleroderma, per HF  2. Enlarging pulmonary nodule - Found incidentally on CT A/P, CT chest with 9mm growing RLL nodule, central and not amenable to CT guided or bronchoscopic bx. Referred to CTS - given age, comorbidities, and severely reduced DLCO she is high risk for surgery. PET scan without uptake (measured on PET to be 7mm). Unclear rate of growth - if continues to enlarge over short period of time (ie 6 months) would recommend resection, but if remains very slow growing would likely opt for monitoring. Repeat CT stable at 6 months (2/2025). Following w/ CT surgery for imaging q 6 months  3. Obstructive Sleep Apnea AHI 62.2  -unable to tolerate treatment, this increases her risk of cardiopulmonary disease but she understands. Has some night time awakenings with SOB, but no alternative treatment available for her. Not a candidate for surgical intervention. Recommend she continue nocturnal oxygen supplementation for significantly increased time spent hypoxic <90% at night.  RTC 8/1 - HFpEF/PH clinic, PFT, 6MWT

## 2025-05-02 NOTE — CARDIOLOGY SUMMARY
[de-identified] : TTE 4/2025: LV 5.1 cm, moderate concentric LVH (1.3 cm) with increased echogenicity of myocardium, LVEF 65-70%, normal RV size/function, moderate LAE, PASP 28 mmHg, RAP 3 mmHg, E/A 1.4, e' velocities 3-5. no obvious respirophasic variation. small pericardial effusion  [de-identified] : RHC 8/3/23 Vitals: HR 67, /70/100, SpO2 %  Baseline RHC: RA - 6 RV - 63/9 PA - 65/28/43 PAWP - 15, excessive respiratory variation CO/CI (Td) - 5.74/3.34 TPG - 28, DPG - 13, PVR - 4.9WU SVR - 1310, PVR/SVR - 0.3  Shaun PA 58/26/39 PAWP 20 (V waves to 27) CO/CI - 4.87/2.83 TPG - 19, DPG - 6 PVR - 3.9

## 2025-05-02 NOTE — ASSESSMENT
[FreeTextEntry1] : 80 yo F with hx of CAD, HTN, HFpEF, pAF, limited scleroderma referred to PH clinic for BRANHAM a/w elevated PASP  1. Pulmonary hypertension- Multifactorial, likely WHO Group I 2/2 scleroderma, WHO Group II PH given HFpEF, severe LAE, HTN, and WHO Group III PH 2/2 severe untreated BO. NYHA FC has improved to I-II (initially III at first visit) with diuresis. She had concern for Barton's sign on prior TTE, but V/Q negative and repeat TTE with normal RV size/function although PASP remains elevated, grade II diastolic dysfunction, and severe LAE.  Her sleep study also confirms severe BO with AHI of 61 and randee SpO2 of 75% however she has been unable to tolerate PAP. She has also subsequently been started on sildenafil for Raynaud's, is at risk for worsening PH-LHD if she is predominantly group II. RHC performed with complex results - she is predominantly precapillary at baseline when well diuresed; however, performed vasoreactivity testing for severe PH and paradoxically had worsening PAWP (20, V waves to 27mmHg) and decrease in CO/CI. Has remained on sildenafil due to severe raynaud's and tolerated well, but now showing evidence of HFpEF (pulmonary venous congestion on CT, worsening SOB, worsening LE edema), TTE 4/4/25 with severe (grade 3) dyastolic dysfunction, normal RV size/function, mild LVH  NOT a candidate for increased vasodilators, need to optimize HFpEF - she had responded well to increase lasix to 40mg, recent admission with dose adjustment. She has been taking 40mg lasix daily and nguyen daily, weight is at 150lbs. She is nervous about continuing this dose for much longer. Remains on Jardiance.     - lasix 20 mg daily + spironolactone. If weight > 150lbs or increased edema increase lasix back up to 40 mg for a few days. F/U HF recommendations -Trend electrolytes and NTproBNP today -Cannot increase vasodilators, can consider repeat RHC if uncertainty re: group II - Cardiac MRI as she is at risk for restrictive CM i/s/o scleroderma, per HF  2. Enlarging pulmonary nodule - Found incidentally on CT A/P, CT chest with 9mm growing RLL nodule, central and not amenable to CT guided or bronchoscopic bx. Referred to CTS - given age, comorbidities, and severely reduced DLCO she is high risk for surgery. PET scan without uptake (measured on PET to be 7mm). Unclear rate of growth - if continues to enlarge over short period of time (ie 6 months) would recommend resection, but if remains very slow growing would likely opt for monitoring. Repeat CT stable at 6 months (2/2025). Following w/ CT surgery for imaging q 6 months  3. Obstructive Sleep Apnea AHI 62.2  -unable to tolerate treatment, this increases her risk of cardiopulmonary disease but she understands. Has some night time awakenings with SOB, but no alternative treatment available for her. Not a candidate for surgical intervention. Recommend she continue nocturnal oxygen supplementation for significantly increased time spent hypoxic <90% at night.  RTC 8/1 - HFpEF/PH clinic, PFT, 6MWT

## 2025-05-02 NOTE — ASSESSMENT
[FreeTextEntry1] : Ms. 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   Today she reports NYHA III symptoms and appears euvolemic on exam.   Suspect HFpEF is related to cardiac involvement of scleroderma and PH but will also assess for pericardial involvement and infiltration with advanced imaging.

## 2025-05-02 NOTE — REASON FOR VISIT
[Follow-Up] : a follow-up visit [Pulmonary Hypertension] : pulmonary hypertension [Shortness of Breath] : shortness of breath [Language Line ] : provided by [employer]   [TextBox_13] : Dr. Fern Peñaloza [Interpreters_IDNumber] : [id number] [Interpreters_FullName] : Naz

## 2025-05-02 NOTE — HISTORY OF PRESENT ILLNESS
[FreeTextEntry1] : Ms. 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   She presents today for consultation. She states she can walk 2 blocks on a flat surface limited by dyspnea and knee pain. She notes occasional orthopnea for a few seconds that resolves on its own. She notes chronic lower extremity edema. She denies chest pain or chest pressure at rest, with exertion, or with deep inspiration. She notes occasional dizziness/lightheadedness. She denies any recent cardiac or heart failure related admissions.

## 2025-05-02 NOTE — DISCUSSION/SUMMARY
[FreeTextEntry1] : # HFpEF - Etiology: suspect due to cardiac involvement of SSc and PH. will obtain cardiac MRI with real time free breathing to assess for overlapping infiltrative process and to assess for constrictive pericarditis in light of small effusion and degree of heart failure  - GDMT: continue Jardiance 10 mg daily and spironolactone. reasonable to continue ARNI which she is tolerating - DIuretics: current regimen is lasix 20 mg daily with extra dose as needed, will continue.  - Will consider screening for ALLAY-HF if cMRI negative   # Pulmonary hypertension - Following with Dr. River    Return to clinic in 3 months in PH-HFpEF clinic. I will call patient with results of MRI

## 2025-05-02 NOTE — PHYSICAL EXAM
[Well Developed] : well developed [Well Nourished] : well nourished [Normal Venous Pressure] : normal venous pressure [Normal S1, S2] : normal S1, S2 [No Murmur] : no murmur [Clear Lung Fields] : clear lung fields [Moves all extremities] : moves all extremities [No Focal Deficits] : no focal deficits [Alert and Oriented] : alert and oriented [Normal memory] : normal memory [de-identified] : No Ananyamauls  [de-identified] : Anne-Marie, trace edema to ankles

## 2025-05-02 NOTE — REASON FOR VISIT
[Follow-Up] : a follow-up visit [Pulmonary Hypertension] : pulmonary hypertension [Shortness of Breath] : shortness of breath [Language Line ] : provided by [employer]   [TextBox_13] : Dr. Fern Peñaloza [Interpreters_IDNumber] : [id number] [Interpreters_FullName] : Naz

## 2025-05-02 NOTE — HISTORY OF PRESENT ILLNESS
[Never] : never [TextBox_4] : Referred by Dr. Peñaloza for Pulmonary Hypertension evaluation in the setting of dilated pulmonary artery, elevated PASP (78 mmHg), worsening dyspnea on exertion and a 6MWT with desaturation to 87% necessitating O2. 2L Her history is significant for limited scleroderma, congestive heart failure with diastolic dysfunction, ground glass opacities on chest CT, multilobar pneumonia, pleural effusion, decreased diffusion capacity of lung and worsenign BRANHAM since COVID booster 11/2021. Concern is for WHO Group II (HFpEF, HTN, LVH, JESUS), III (found to have severe BO AHI 61), at risk for WHO Group I in setting of limited scleroderma. V/Q scan (neg for CTEPH). She did not tolerate CPAP and her most recent echo 9/2022 PASP was 66 mmHg, but RV size/function now normalized, + Grade II diastolic dysfunction and severe LAE. She started Jardiance October 2022.  Had significant Raynauds w/ ulceration and ultimately started on sildenafil, isosorbide was discontinued. Diuretics adjusted for LE edema. She was unable to tolerate PAP for severe BO, is not a candidate for inspire. Found to have pulmonary nodule incidentally when sent for CT A/P, enlarging from 2mm in 2019 to 9mm in 2024 that is not PET avid but likely represents malignancy. Following with serial CT scans for growth as pt opted against surgical resection. Repeat CT performed demonstrating stable size, but worsening pulmonary venous congestion  PH regimen sildenafil 20 mg TID  5-2-25 Here for HFpEF clinic. She took 40 mg furosemide 4/22 - 4/27 and reported improvement in her swelling. She was nervous to continue 40 mg and her weight was at 150 lbs so she returned to 20 mg furosemide and 25 mg spironolactone.  Today she feels "not bad" Her weight has been stable since 4/28 on20 mg furosemide. Her breathing is "not bad" Has to stop when she walks but only has to rest for a few seconds. Her legs are equally contributing to her need to stop and rest as her breathing. Her legs get very tired. Has numbness and tingling of the front of her leg. Her ankles always look swollen, They are the same whether she takes 40 mg or 20 mg of furosemide. Gets SOB the first few minutes of lying down then it resolves.

## 2025-05-02 NOTE — CONSULT LETTER
[Dear  ___] : Dear  [unfilled], [Consult Letter:] : I had the pleasure of evaluating your patient, [unfilled]. [Please see my note below.] : Please see my note below. [Consult Closing:] : Thank you very much for allowing me to participate in the care of this patient.  If you have any questions, please do not hesitate to contact me. [Sincerely,] : Sincerely, [DrMargo  ___] : Dr. MATOS [FreeTextEntry2] : Dr. Fern Peñaloza\par  [address]\[place] [address]\par  \par  Fax [phone] [FreeTextEntry3] : Glory River DO \par  Director of Pulmonary Hypertension\par  Department of Pulmonary and Critical Care\par  [facility]

## 2025-05-02 NOTE — HISTORY OF PRESENT ILLNESS
[FreeTextEntry1] : Ms. 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   She presents today for consultation. She states she can walk 2 blocks on a flat surface limited by dyspnea and knee pain. She notes occasional orthopnea for a few seconds that resolves on its own. She notes chronic lower extremity edema. She denies chest pain or chest pressure at rest, with exertion, or with deep inspiration. She notes occasional dizziness/lightheadedness. She denies any recent cardiac or heart failure related admissions.

## 2025-05-02 NOTE — CARDIOLOGY SUMMARY
[de-identified] : TTE 4/2025: LV 5.1 cm, moderate concentric LVH (1.3 cm) with increased echogenicity of myocardium, LVEF 65-70%, normal RV size/function, moderate LAE, PASP 28 mmHg, RAP 3 mmHg, E/A 1.4, e' velocities 3-5. no obvious respirophasic variation. small pericardial effusion  [de-identified] : RHC 8/3/23 Vitals: HR 67, /70/100, SpO2 %  Baseline RHC: RA - 6 RV - 63/9 PA - 65/28/43 PAWP - 15, excessive respiratory variation CO/CI (Td) - 5.74/3.34 TPG - 28, DPG - 13, PVR - 4.9WU SVR - 1310, PVR/SVR - 0.3  Shaun PA 58/26/39 PAWP 20 (V waves to 27) CO/CI - 4.87/2.83 TPG - 19, DPG - 6 PVR - 3.9

## 2025-05-02 NOTE — ASSESSMENT
[FreeTextEntry1] : Ms. 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   Today she reports NYHA III symptoms and appears euvolemic on exam.   Suspect HFpEF is related to cardiac involvement of scleroderma and PH but will also assess for pericardial involvement and infiltration with advanced imaging.